# Patient Record
Sex: FEMALE | Race: OTHER | HISPANIC OR LATINO | URBAN - METROPOLITAN AREA
[De-identification: names, ages, dates, MRNs, and addresses within clinical notes are randomized per-mention and may not be internally consistent; named-entity substitution may affect disease eponyms.]

---

## 2022-04-02 ENCOUNTER — EMERGENCY (EMERGENCY)
Facility: HOSPITAL | Age: 32
LOS: 1 days | Discharge: HOME | End: 2022-04-02
Attending: EMERGENCY MEDICINE
Payer: MEDICAID

## 2022-04-02 VITALS
HEIGHT: 66 IN | RESPIRATION RATE: 18 BRPM | SYSTOLIC BLOOD PRESSURE: 117 MMHG | WEIGHT: 139.99 LBS | TEMPERATURE: 98 F | DIASTOLIC BLOOD PRESSURE: 78 MMHG | OXYGEN SATURATION: 100 % | HEART RATE: 93 BPM

## 2022-04-02 LAB
ALBUMIN SERPL ELPH-MCNC: 3.4 G/DL — LOW (ref 3.5–5)
ALP SERPL-CCNC: 78 U/L — SIGNIFICANT CHANGE UP (ref 40–120)
ALT FLD-CCNC: 28 U/L DA — SIGNIFICANT CHANGE UP (ref 10–60)
ANION GAP SERPL CALC-SCNC: 6 MMOL/L — SIGNIFICANT CHANGE UP (ref 5–17)
AST SERPL-CCNC: 27 U/L — SIGNIFICANT CHANGE UP (ref 10–40)
BASOPHILS # BLD AUTO: 0.02 K/UL — SIGNIFICANT CHANGE UP (ref 0–0.2)
BASOPHILS NFR BLD AUTO: 0.4 % — SIGNIFICANT CHANGE UP (ref 0–2)
BILIRUB SERPL-MCNC: 0.4 MG/DL — SIGNIFICANT CHANGE UP (ref 0.2–1.2)
BUN SERPL-MCNC: 9 MG/DL — SIGNIFICANT CHANGE UP (ref 7–18)
CALCIUM SERPL-MCNC: 8.7 MG/DL — SIGNIFICANT CHANGE UP (ref 8.4–10.5)
CHLORIDE SERPL-SCNC: 108 MMOL/L — SIGNIFICANT CHANGE UP (ref 96–108)
CO2 SERPL-SCNC: 25 MMOL/L — SIGNIFICANT CHANGE UP (ref 22–31)
CREAT SERPL-MCNC: 0.7 MG/DL — SIGNIFICANT CHANGE UP (ref 0.5–1.3)
EGFR: 118 ML/MIN/1.73M2 — SIGNIFICANT CHANGE UP
EOSINOPHIL # BLD AUTO: 0.02 K/UL — SIGNIFICANT CHANGE UP (ref 0–0.5)
EOSINOPHIL NFR BLD AUTO: 0.4 % — SIGNIFICANT CHANGE UP (ref 0–6)
GLUCOSE SERPL-MCNC: 112 MG/DL — HIGH (ref 70–99)
HCG SERPL-ACNC: <1 MIU/ML — SIGNIFICANT CHANGE UP
HCT VFR BLD CALC: 38.2 % — SIGNIFICANT CHANGE UP (ref 34.5–45)
HGB BLD-MCNC: 12.9 G/DL — SIGNIFICANT CHANGE UP (ref 11.5–15.5)
IMM GRANULOCYTES NFR BLD AUTO: 0.2 % — SIGNIFICANT CHANGE UP (ref 0–1.5)
LIDOCAIN IGE QN: 223 U/L — SIGNIFICANT CHANGE UP (ref 73–393)
LYMPHOCYTES # BLD AUTO: 2.08 K/UL — SIGNIFICANT CHANGE UP (ref 1–3.3)
LYMPHOCYTES # BLD AUTO: 38.5 % — SIGNIFICANT CHANGE UP (ref 13–44)
MCHC RBC-ENTMCNC: 26.9 PG — LOW (ref 27–34)
MCHC RBC-ENTMCNC: 33.8 GM/DL — SIGNIFICANT CHANGE UP (ref 32–36)
MCV RBC AUTO: 79.6 FL — LOW (ref 80–100)
MONOCYTES # BLD AUTO: 0.34 K/UL — SIGNIFICANT CHANGE UP (ref 0–0.9)
MONOCYTES NFR BLD AUTO: 6.3 % — SIGNIFICANT CHANGE UP (ref 2–14)
NEUTROPHILS # BLD AUTO: 2.93 K/UL — SIGNIFICANT CHANGE UP (ref 1.8–7.4)
NEUTROPHILS NFR BLD AUTO: 54.2 % — SIGNIFICANT CHANGE UP (ref 43–77)
NRBC # BLD: 0 /100 WBCS — SIGNIFICANT CHANGE UP (ref 0–0)
PLATELET # BLD AUTO: 238 K/UL — SIGNIFICANT CHANGE UP (ref 150–400)
POTASSIUM SERPL-MCNC: 3.7 MMOL/L — SIGNIFICANT CHANGE UP (ref 3.5–5.3)
POTASSIUM SERPL-SCNC: 3.7 MMOL/L — SIGNIFICANT CHANGE UP (ref 3.5–5.3)
PROT SERPL-MCNC: 7.5 G/DL — SIGNIFICANT CHANGE UP (ref 6–8.3)
RBC # BLD: 4.8 M/UL — SIGNIFICANT CHANGE UP (ref 3.8–5.2)
RBC # FLD: 14.4 % — SIGNIFICANT CHANGE UP (ref 10.3–14.5)
SODIUM SERPL-SCNC: 139 MMOL/L — SIGNIFICANT CHANGE UP (ref 135–145)
TROPONIN I, HIGH SENSITIVITY RESULT: <3 NG/L — SIGNIFICANT CHANGE UP
WBC # BLD: 5.4 K/UL — SIGNIFICANT CHANGE UP (ref 3.8–10.5)
WBC # FLD AUTO: 5.4 K/UL — SIGNIFICANT CHANGE UP (ref 3.8–10.5)

## 2022-04-02 PROCEDURE — 99285 EMERGENCY DEPT VISIT HI MDM: CPT | Mod: 25

## 2022-04-02 PROCEDURE — 85025 COMPLETE CBC W/AUTO DIFF WBC: CPT

## 2022-04-02 PROCEDURE — 83690 ASSAY OF LIPASE: CPT

## 2022-04-02 PROCEDURE — 71046 X-RAY EXAM CHEST 2 VIEWS: CPT | Mod: 26

## 2022-04-02 PROCEDURE — 36415 COLL VENOUS BLD VENIPUNCTURE: CPT

## 2022-04-02 PROCEDURE — 84484 ASSAY OF TROPONIN QUANT: CPT

## 2022-04-02 PROCEDURE — 84702 CHORIONIC GONADOTROPIN TEST: CPT

## 2022-04-02 PROCEDURE — 99284 EMERGENCY DEPT VISIT MOD MDM: CPT

## 2022-04-02 PROCEDURE — 71046 X-RAY EXAM CHEST 2 VIEWS: CPT

## 2022-04-02 PROCEDURE — 93005 ELECTROCARDIOGRAM TRACING: CPT

## 2022-04-02 PROCEDURE — 80053 COMPREHEN METABOLIC PANEL: CPT

## 2022-04-02 PROCEDURE — 96374 THER/PROPH/DIAG INJ IV PUSH: CPT

## 2022-04-02 RX ORDER — FAMOTIDINE 10 MG/ML
20 INJECTION INTRAVENOUS ONCE
Refills: 0 | Status: COMPLETED | OUTPATIENT
Start: 2022-04-02 | End: 2022-04-02

## 2022-04-02 RX ORDER — LIDOCAINE 4 G/100G
10 CREAM TOPICAL ONCE
Refills: 0 | Status: COMPLETED | OUTPATIENT
Start: 2022-04-02 | End: 2022-04-02

## 2022-04-02 RX ADMIN — Medication 30 MILLILITER(S): at 18:03

## 2022-04-02 RX ADMIN — LIDOCAINE 10 MILLILITER(S): 4 CREAM TOPICAL at 18:03

## 2022-04-02 RX ADMIN — FAMOTIDINE 20 MILLIGRAM(S): 10 INJECTION INTRAVENOUS at 18:03

## 2022-04-02 NOTE — ED PROVIDER NOTE - PATIENT PORTAL LINK FT
You can access the FollowMyHealth Patient Portal offered by [facility] by registering at the following website: [email or web address]. By joining [employer]’s FollowMyHealth portal, you will also be able to view your health information using other applications (apps) compatible with our system.

## 2022-04-02 NOTE — ED ADULT TRIAGE NOTE - CHIEF COMPLAINT QUOTE
c/o episode of epigastric pain this morning , felt heart beating fast and feels like gonna pass out . no chest pain

## 2022-04-02 NOTE — ED PROVIDER NOTE - NSFOLLOWUPCLINICS_GEN_ALL_ED_FT
[facility]  Internal Medicine  [address] [place] [address]  Phone: [phone]  Fax: [phone]

## 2022-04-02 NOTE — ED PROVIDER NOTE - NSFOLLOWUPINSTRUCTIONS_ED_ALL_ED_FT
Take Tylenol, Maalox, Pepcid over the counter as needed for pains.  Follow up with your doctor or in the Clinic as discussed within 1 week.  Return to the ER for any concerns.

## 2022-04-02 NOTE — ED ADULT NURSE NOTE - BRAND OF COVID-19 VACCINATION
Pfizer dose 1 and 2 pt. was sent from the shelter with c/o intoxication with unknown substance. pt. is responsive to verbal stimuli.      PE appears unremarkable of trauma, denies complaint, no focal neuro deficits appreciated at present

## 2022-04-02 NOTE — ED PROVIDER NOTE - OBJECTIVE STATEMENT
31yo F w hx of gastritis and palpitations on Metoprolol, in the ED for epigastric discomfort/bloating that radiates in the mid chest and L side of the neck. Assoc w palpitations and dizziness earlier today. Similar stx prior in the past. Minimally symptomatic upon exam. Denies cough/SOB/leg selling or pain, recent travel or sick contacts. No hx of ACS in the family. Not on OCP

## 2022-04-02 NOTE — ED ADULT NURSE NOTE - OBJECTIVE STATEMENT
[id number].  Patient present with c/o epigastric pain [id number].  Patient present with c/o epigastric pain on off associated with neck pain, palpitation. denies pain at present.

## 2022-04-02 NOTE — ED PROVIDER NOTE - CLINICAL SUMMARY MEDICAL DECISION MAKING FREE TEXT BOX
31yo w epig/chest and neck pain, a/w palpitations and dizziness. Low risk for cardiogenic syncope ACS (low HEART score), PE (PERC-0) or dissection. Likely gastritis, +/- anxiety. Will  get cardiac/abdom w/u, treat w GI cocktail, likely DC

## 2022-04-02 NOTE — ED PROVIDER NOTE - PHYSICAL EXAMINATION
Well appearing, in NAD  Moist mucosae  Pink conjunctivae  Lungs clear  Cardiac w RRR, no JVD  Abdomen soft/NT  No CVAT  No pedal edema, no calf TTP  Neck supple  AAOx3, no gross neuro deficits

## 2022-04-02 NOTE — ED ADULT NURSE NOTE - NSIMPLEMENTINTERV_GEN_ALL_ED
Implemented All Universal Safety Interventions:  Antoine to call system. Call bell, personal items and telephone within reach. Instruct patient to call for assistance. Room bathroom lighting operational. Non-slip footwear when patient is off stretcher. Physically safe environment: no spills, clutter or unnecessary equipment. Stretcher in lowest position, wheels locked, appropriate side rails in place.

## 2022-04-02 NOTE — ED ADULT TRIAGE NOTE - SOURCE OF INFORMATION
Pt. Was given 2.5 mg of methadone at 1630 and was not able to keep it down.  Danitza was advised.   Patient

## 2022-05-09 ENCOUNTER — EMERGENCY (EMERGENCY)
Facility: HOSPITAL | Age: 32
LOS: 1 days | Discharge: HOME | End: 2022-05-09
Attending: EMERGENCY MEDICINE
Payer: MEDICAID

## 2022-05-09 VITALS
HEART RATE: 76 BPM | DIASTOLIC BLOOD PRESSURE: 72 MMHG | HEIGHT: 66 IN | WEIGHT: 138.01 LBS | SYSTOLIC BLOOD PRESSURE: 128 MMHG | OXYGEN SATURATION: 100 % | TEMPERATURE: 98 F | RESPIRATION RATE: 16 BRPM

## 2022-05-09 VITALS
HEART RATE: 70 BPM | OXYGEN SATURATION: 100 % | SYSTOLIC BLOOD PRESSURE: 104 MMHG | RESPIRATION RATE: 18 BRPM | DIASTOLIC BLOOD PRESSURE: 67 MMHG

## 2022-05-09 LAB
ALBUMIN SERPL ELPH-MCNC: 3.9 G/DL — SIGNIFICANT CHANGE UP (ref 3.5–5)
ALP SERPL-CCNC: 81 U/L — SIGNIFICANT CHANGE UP (ref 40–120)
ALT FLD-CCNC: 29 U/L DA — SIGNIFICANT CHANGE UP (ref 10–60)
ANION GAP SERPL CALC-SCNC: 2 MMOL/L — LOW (ref 5–17)
AST SERPL-CCNC: 24 U/L — SIGNIFICANT CHANGE UP (ref 10–40)
BASOPHILS # BLD AUTO: 0.02 K/UL — SIGNIFICANT CHANGE UP (ref 0–0.2)
BASOPHILS NFR BLD AUTO: 0.4 % — SIGNIFICANT CHANGE UP (ref 0–2)
BILIRUB SERPL-MCNC: 0.3 MG/DL — SIGNIFICANT CHANGE UP (ref 0.2–1.2)
BUN SERPL-MCNC: 10 MG/DL — SIGNIFICANT CHANGE UP (ref 7–18)
CALCIUM SERPL-MCNC: 9.3 MG/DL — SIGNIFICANT CHANGE UP (ref 8.4–10.5)
CHLORIDE SERPL-SCNC: 107 MMOL/L — SIGNIFICANT CHANGE UP (ref 96–108)
CO2 SERPL-SCNC: 28 MMOL/L — SIGNIFICANT CHANGE UP (ref 22–31)
CREAT SERPL-MCNC: 0.68 MG/DL — SIGNIFICANT CHANGE UP (ref 0.5–1.3)
EGFR: 119 ML/MIN/1.73M2 — SIGNIFICANT CHANGE UP
EOSINOPHIL # BLD AUTO: 0.05 K/UL — SIGNIFICANT CHANGE UP (ref 0–0.5)
EOSINOPHIL NFR BLD AUTO: 1 % — SIGNIFICANT CHANGE UP (ref 0–6)
GLUCOSE SERPL-MCNC: 92 MG/DL — SIGNIFICANT CHANGE UP (ref 70–99)
HCG UR QL: NEGATIVE — SIGNIFICANT CHANGE UP
HCT VFR BLD CALC: 41.2 % — SIGNIFICANT CHANGE UP (ref 34.5–45)
HGB BLD-MCNC: 13.9 G/DL — SIGNIFICANT CHANGE UP (ref 11.5–15.5)
IMM GRANULOCYTES NFR BLD AUTO: 0 % — SIGNIFICANT CHANGE UP (ref 0–1.5)
LYMPHOCYTES # BLD AUTO: 3.1 K/UL — SIGNIFICANT CHANGE UP (ref 1–3.3)
LYMPHOCYTES # BLD AUTO: 64 % — HIGH (ref 13–44)
MAGNESIUM SERPL-MCNC: 2 MG/DL — SIGNIFICANT CHANGE UP (ref 1.6–2.6)
MCHC RBC-ENTMCNC: 26.8 PG — LOW (ref 27–34)
MCHC RBC-ENTMCNC: 33.7 GM/DL — SIGNIFICANT CHANGE UP (ref 32–36)
MCV RBC AUTO: 79.5 FL — LOW (ref 80–100)
MONOCYTES # BLD AUTO: 0.4 K/UL — SIGNIFICANT CHANGE UP (ref 0–0.9)
MONOCYTES NFR BLD AUTO: 8.3 % — SIGNIFICANT CHANGE UP (ref 2–14)
NEUTROPHILS # BLD AUTO: 1.27 K/UL — LOW (ref 1.8–7.4)
NEUTROPHILS NFR BLD AUTO: 26.3 % — LOW (ref 43–77)
NRBC # BLD: 0 /100 WBCS — SIGNIFICANT CHANGE UP (ref 0–0)
PLATELET # BLD AUTO: 242 K/UL — SIGNIFICANT CHANGE UP (ref 150–400)
POTASSIUM SERPL-MCNC: 4.5 MMOL/L — SIGNIFICANT CHANGE UP (ref 3.5–5.3)
POTASSIUM SERPL-SCNC: 4.5 MMOL/L — SIGNIFICANT CHANGE UP (ref 3.5–5.3)
PROT SERPL-MCNC: 8.1 G/DL — SIGNIFICANT CHANGE UP (ref 6–8.3)
RBC # BLD: 5.18 M/UL — SIGNIFICANT CHANGE UP (ref 3.8–5.2)
RBC # FLD: 14.1 % — SIGNIFICANT CHANGE UP (ref 10.3–14.5)
SODIUM SERPL-SCNC: 137 MMOL/L — SIGNIFICANT CHANGE UP (ref 135–145)
TROPONIN I, HIGH SENSITIVITY RESULT: 3.8 NG/L — SIGNIFICANT CHANGE UP
WBC # BLD: 4.84 K/UL — SIGNIFICANT CHANGE UP (ref 3.8–10.5)
WBC # FLD AUTO: 4.84 K/UL — SIGNIFICANT CHANGE UP (ref 3.8–10.5)

## 2022-05-09 PROCEDURE — 83735 ASSAY OF MAGNESIUM: CPT

## 2022-05-09 PROCEDURE — 80053 COMPREHEN METABOLIC PANEL: CPT

## 2022-05-09 PROCEDURE — 85025 COMPLETE CBC W/AUTO DIFF WBC: CPT

## 2022-05-09 PROCEDURE — 84484 ASSAY OF TROPONIN QUANT: CPT

## 2022-05-09 PROCEDURE — 36415 COLL VENOUS BLD VENIPUNCTURE: CPT

## 2022-05-09 PROCEDURE — 81025 URINE PREGNANCY TEST: CPT

## 2022-05-09 PROCEDURE — 71045 X-RAY EXAM CHEST 1 VIEW: CPT

## 2022-05-09 PROCEDURE — 99285 EMERGENCY DEPT VISIT HI MDM: CPT | Mod: 25

## 2022-05-09 PROCEDURE — 99284 EMERGENCY DEPT VISIT MOD MDM: CPT

## 2022-05-09 PROCEDURE — 71045 X-RAY EXAM CHEST 1 VIEW: CPT | Mod: 26

## 2022-05-09 PROCEDURE — 93005 ELECTROCARDIOGRAM TRACING: CPT

## 2022-05-09 RX ORDER — METHOCARBAMOL 500 MG/1
1 TABLET, FILM COATED ORAL
Qty: 15 | Refills: 0
Start: 2022-05-09 | End: 2022-05-13

## 2022-05-09 RX ORDER — KETOROLAC TROMETHAMINE 30 MG/ML
15 SYRINGE (ML) INJECTION ONCE
Refills: 0 | Status: DISCONTINUED | OUTPATIENT
Start: 2022-05-09 | End: 2022-05-09

## 2022-05-09 RX ORDER — METHOCARBAMOL 500 MG/1
750 TABLET, FILM COATED ORAL ONCE
Refills: 0 | Status: COMPLETED | OUTPATIENT
Start: 2022-05-09 | End: 2022-05-09

## 2022-05-09 RX ADMIN — METHOCARBAMOL 750 MILLIGRAM(S): 500 TABLET, FILM COATED ORAL at 21:13

## 2022-05-09 NOTE — ED PROVIDER NOTE - NS ED ATTENDING STATEMENT MOD
This was a shared visit with the MARISOL. I reviewed and verified the documentation and independently performed the documented:

## 2022-05-09 NOTE — ED PROVIDER NOTE - NSFOLLOWUPINSTRUCTIONS_ED_ALL_ED_FT
Seguimiento con el médico de atención primaria en 2-3 días.  Si experimenta síntomas nuevos o que empeoran, o si está preocupado, siempre puede regresar a la emergencia para sidney reevaluación.  Si le dieron alguna receta charan la visita de radha ramirez según lo prescrito. Si tiene alguna marc puede preguntar al farmacéutico.    * * *    Follow up with the primary care doctor in 2-3 days.  If you experience any new or worsening symptoms or if you are concerned you can always come back to the emergency for a re-evaluation.  If there were any prescriptions given to you during the visit today take them as prescribed. If you have any questions you can ask the pharmacist.

## 2022-05-09 NOTE — ED PROVIDER NOTE - PATIENT PORTAL LINK FT
You can access the FollowMyHealth Patient Portal offered by [facility] by registering at the following website: [email or web address]. By joining [employer]’s FollowMyHealth portal, you will also be able to view your health information using other applications (apps) compatible with our system.

## 2022-05-09 NOTE — ED PROVIDER NOTE - PHYSICAL EXAMINATION
Neck supple, full range of motion. No spinal/paraspinal tenderness to palpation.   Bilateral shoulder full range of motion. No clavicular or AC joint tenderness.  Upper extremities strength 5/5.  No facial/neck/back discoloration, erythema or induration or tenderness to palpation.

## 2022-05-09 NOTE — ED ADULT NURSE NOTE - NS_ED_NURSE_TEACHING_TOPIC_ED_A_ED
Patient is requesting refill for irbesartan (AVAPRO) 300 MG tablet  Last OV 6-, patient has no upcoming appointment scheduled    Medication refilled per protocol, medication sent to preferred pharmacy.   Medications

## 2022-05-09 NOTE — ED PROVIDER NOTE - PROGRESS NOTE DETAILS
Feeling much better after Robaxin. No chest pain. CXR NAD. Labs unremarkable. PERC negative. HEART score 0. Low suspicion for ACS/PE/dissection/cord compression/pneumonia. Symptoms most likely MSK etiology. WIll dc with PMD follow up in 2-3 days. Patient has had similar episodes of pains in the past without cause. Pt is well appearing walking with steady gait, stable for discharge and follow up without fail with medical doctor. I had a detailed discussion with the patient and/or guardian regarding the historical points, exam findings, and any diagnostic results supporting the discharge diagnosis. Pt educated on care and need for follow up. Strict return instructions and red flag signs and symptoms discussed with patient. Questions answered. Pt shows understanding of discharge information and agrees to follow.

## 2022-05-09 NOTE — ED PROVIDER NOTE - CLINICAL SUMMARY MEDICAL DECISION MAKING FREE TEXT BOX
32 year-old female, presents with intermittent L sided pain since yesterday. Well-appearing, neurovascularly intact. PLan: ECG, CXR, labs, urine, meds, re-assess.

## 2022-05-09 NOTE — ED PROVIDER NOTE - OBJECTIVE STATEMENT
ID# [id number] (Lewis): 32 year-old female, history of MVP, tachycardia on Metoprolol, presents with multiple medical complaints. since yesterday. Intermittent sharp pain to L upper back, L side of neck/face, L arm pain and L side of the chest. Pain started suddenly, last for ~ 3 minutes and resolve on its own. No specific triggers or alleviating factors.  ID# [id number] (Lewis): 32 year-old female, history of MVP, tachycardia on Metoprolol, presents with multiple medical complaints. since yesterday. Intermittent sharp pain to L upper back, L side of neck/face, L arm pain and L side of the chest. Pain started suddenly, last for ~ 3 minutes and resolve on its own. No specific triggers or alleviating factors. Denies any recent illness, any recent long travel, any birth control use. Denies any fever, chills, neck pain, numbness, tingling, focal weakness, SOB, dizziness, palpitations, leg swelling/pain or any other complaints.

## 2022-05-10 PROBLEM — K29.70 GASTRITIS, UNSPECIFIED, WITHOUT BLEEDING: Chronic | Status: ACTIVE | Noted: 2022-04-02

## 2025-06-06 ENCOUNTER — APPOINTMENT (OUTPATIENT)
Dept: ANTEPARTUM | Facility: CLINIC | Age: 35
End: 2025-06-06

## 2025-06-08 ENCOUNTER — INPATIENT (INPATIENT)
Facility: HOSPITAL | Age: 35
LOS: 2 days | Discharge: HOME | DRG: 833 | End: 2025-06-11
Attending: INTERNAL MEDICINE | Admitting: INTERNAL MEDICINE
Payer: MEDICAID

## 2025-06-08 VITALS
OXYGEN SATURATION: 99 % | WEIGHT: 147.27 LBS | HEART RATE: 76 BPM | SYSTOLIC BLOOD PRESSURE: 150 MMHG | DIASTOLIC BLOOD PRESSURE: 89 MMHG | HEIGHT: 66 IN | RESPIRATION RATE: 18 BRPM | TEMPERATURE: 98 F

## 2025-06-08 LAB
ALBUMIN SERPL ELPH-MCNC: 2.5 G/DL — LOW (ref 3.5–5)
ALP SERPL-CCNC: 63 U/L — SIGNIFICANT CHANGE UP (ref 40–120)
ALT FLD-CCNC: 25 U/L DA — SIGNIFICANT CHANGE UP (ref 10–60)
ANION GAP SERPL CALC-SCNC: 6 MMOL/L — SIGNIFICANT CHANGE UP (ref 5–17)
AST SERPL-CCNC: 23 U/L — SIGNIFICANT CHANGE UP (ref 10–40)
BASOPHILS # BLD AUTO: 0.02 K/UL — SIGNIFICANT CHANGE UP (ref 0–0.2)
BASOPHILS NFR BLD AUTO: 0.3 % — SIGNIFICANT CHANGE UP (ref 0–2)
BILIRUB SERPL-MCNC: 0.2 MG/DL — SIGNIFICANT CHANGE UP (ref 0.2–1.2)
BUN SERPL-MCNC: 11 MG/DL — SIGNIFICANT CHANGE UP (ref 7–18)
CALCIUM SERPL-MCNC: 8.8 MG/DL — SIGNIFICANT CHANGE UP (ref 8.4–10.5)
CHLORIDE SERPL-SCNC: 107 MMOL/L — SIGNIFICANT CHANGE UP (ref 96–108)
CO2 SERPL-SCNC: 23 MMOL/L — SIGNIFICANT CHANGE UP (ref 22–31)
CREAT SERPL-MCNC: 0.51 MG/DL — SIGNIFICANT CHANGE UP (ref 0.5–1.3)
EOSINOPHIL # BLD AUTO: 0.02 K/UL — SIGNIFICANT CHANGE UP (ref 0–0.5)
EOSINOPHIL NFR BLD AUTO: 0.3 % — SIGNIFICANT CHANGE UP (ref 0–6)
GLUCOSE SERPL-MCNC: 73 MG/DL — SIGNIFICANT CHANGE UP (ref 70–99)
HCT VFR BLD CALC: 31.6 % — LOW (ref 34.5–45)
HGB BLD-MCNC: 11.3 G/DL — LOW (ref 11.5–15.5)
IMM GRANULOCYTES NFR BLD AUTO: 0.3 % — SIGNIFICANT CHANGE UP (ref 0–0.9)
LIDOCAIN IGE QN: 69 U/L — SIGNIFICANT CHANGE UP (ref 13–75)
LYMPHOCYTES # BLD AUTO: 2.12 K/UL — SIGNIFICANT CHANGE UP (ref 1–3.3)
LYMPHOCYTES # BLD AUTO: 27.5 % — SIGNIFICANT CHANGE UP (ref 13–44)
MAGNESIUM SERPL-MCNC: 1.8 MG/DL — SIGNIFICANT CHANGE UP (ref 1.6–2.6)
MCHC RBC-ENTMCNC: 28 PG — SIGNIFICANT CHANGE UP (ref 27–34)
MCHC RBC-ENTMCNC: 35.8 G/DL — SIGNIFICANT CHANGE UP (ref 32–36)
MCV RBC AUTO: 78.2 FL — LOW (ref 80–100)
MONOCYTES # BLD AUTO: 0.97 K/UL — HIGH (ref 0–0.9)
MONOCYTES NFR BLD AUTO: 12.6 % — SIGNIFICANT CHANGE UP (ref 2–14)
NEUTROPHILS # BLD AUTO: 4.56 K/UL — SIGNIFICANT CHANGE UP (ref 1.8–7.4)
NEUTROPHILS NFR BLD AUTO: 59 % — SIGNIFICANT CHANGE UP (ref 43–77)
NRBC BLD AUTO-RTO: 0 /100 WBCS — SIGNIFICANT CHANGE UP (ref 0–0)
PLATELET # BLD AUTO: 203 K/UL — SIGNIFICANT CHANGE UP (ref 150–400)
POTASSIUM SERPL-MCNC: 4.3 MMOL/L — SIGNIFICANT CHANGE UP (ref 3.5–5.3)
POTASSIUM SERPL-SCNC: 4.3 MMOL/L — SIGNIFICANT CHANGE UP (ref 3.5–5.3)
PROT SERPL-MCNC: 6.7 G/DL — SIGNIFICANT CHANGE UP (ref 6–8.3)
RBC # BLD: 4.04 M/UL — SIGNIFICANT CHANGE UP (ref 3.8–5.2)
RBC # FLD: 15 % — HIGH (ref 10.3–14.5)
SODIUM SERPL-SCNC: 136 MMOL/L — SIGNIFICANT CHANGE UP (ref 135–145)
TROPONIN I, HIGH SENSITIVITY RESULT: 8.2 NG/L — SIGNIFICANT CHANGE UP
WBC # BLD: 7.71 K/UL — SIGNIFICANT CHANGE UP (ref 3.8–10.5)
WBC # FLD AUTO: 7.71 K/UL — SIGNIFICANT CHANGE UP (ref 3.8–10.5)

## 2025-06-08 PROCEDURE — 99285 EMERGENCY DEPT VISIT HI MDM: CPT

## 2025-06-08 PROCEDURE — 76817 TRANSVAGINAL US OBSTETRIC: CPT | Mod: 26

## 2025-06-08 PROCEDURE — 76815 OB US LIMITED FETUS(S): CPT | Mod: 26

## 2025-06-08 RX ORDER — ACETAMINOPHEN 500 MG/5ML
1000 LIQUID (ML) ORAL ONCE
Refills: 0 | Status: COMPLETED | OUTPATIENT
Start: 2025-06-08 | End: 2025-06-08

## 2025-06-08 RX ADMIN — Medication 400 MILLIGRAM(S): at 22:50

## 2025-06-08 RX ADMIN — Medication 1000 MILLILITER(S): at 22:50

## 2025-06-08 NOTE — ED ADULT TRIAGE NOTE - CHIEF COMPLAINT QUOTE
pt stated she is 12 weeks pregnant with high blood pressure and stomach pain denies vaginal bleeding or leaking.

## 2025-06-08 NOTE — ED PROVIDER NOTE - PROGRESS NOTE DETAILS
Ultrasound results are discussed with the patient with  ID [id number].  All questions answered.  Patient is okay with being admitted.  Case was discussed with Dr. Nunez -agrees with admission. Concern for hyperthyroidism

## 2025-06-08 NOTE — ED PROVIDER NOTE - INTERNATIONAL TRAVEL
Central Prior Authorization Team   Phone: [phone]      PA Initiation    Medication: hydrocodone-Initiated  Insurance Company: [employer] | [employer] - Phone [phone] Fax [phone]  Pharmacy Filling the Rx: [id number] - [place], [place] - [address] AT [facility] [address]  Filling Pharmacy Phone: [phone]  Filling Pharmacy Fax:    Start Date: 6/22/2020         No

## 2025-06-08 NOTE — ED PROVIDER NOTE - CLINICAL SUMMARY MEDICAL DECISION MAKING FREE TEXT BOX
35-year-old female Bulgarian-speaking  ID [id number] and [id number] coming in with intermittent palpitations, chest pain, and concern for elevated blood pressure systolic high 150s, headache, abdominal pain.  No nausea, vomiting, diarrhea, fevers, chills.  Patient reports she is 12 weeks pregnant and before pregnancy she was taking metoprolol for tachycardia.  She was told to stop taking the medication since she has been pregnant.  She had preeclampsia during her first pregnancy so her OB started her on aspirin.  Endorses lower abdominal pain has been ongoing since the pregnancy started.  Her OB is aware -last ultrasound was a week ago and she was told she has hydrops fetalis.  No vaginal bleeding at this time.  Does not have palpitations at this time.  Does not take anything for hypertension.    Patient is nontoxic-appearing.  No distress.  Regular rate and rhythm.  Clear to auscultation bilaterally.  Abdomen soft with diffuse tenderness to palpation in lower abdomen.  No CVA tenderness to palpation.  Equal strength and sensation in all extremities.    Differential diagnoses include but not limited to electrolyte abnormalities, anemia, UTI, threatened , ?  Intermittent SVT given the history, thyroid pathology.    Patient has a cardiologist.  Patient reports she was seen at [facility] for the symptoms and was told she needs echocardiogram because her troponin was elevated.  However due to system issues she was going to do outpatient workup but unable to make appointments. States that both cardiologist and OB are at [facility] and they are having "system shutdown" which is delaying her appointments and is unable to discuss her current symptoms with them.

## 2025-06-09 DIAGNOSIS — O02.1 MISSED ABORTION: ICD-10-CM

## 2025-06-09 DIAGNOSIS — I47.10 SUPRAVENTRICULAR TACHYCARDIA, UNSPECIFIED: ICD-10-CM

## 2025-06-09 DIAGNOSIS — O14.90 UNSPECIFIED PRE-ECLAMPSIA, UNSPECIFIED TRIMESTER: ICD-10-CM

## 2025-06-09 DIAGNOSIS — K21.9 GASTRO-ESOPHAGEAL REFLUX DISEASE WITHOUT ESOPHAGITIS: ICD-10-CM

## 2025-06-09 DIAGNOSIS — F32.9 MAJOR DEPRESSIVE DISORDER, SINGLE EPISODE, UNSPECIFIED: ICD-10-CM

## 2025-06-09 DIAGNOSIS — O26.899 OTHER SPECIFIED PREGNANCY RELATED CONDITIONS, UNSPECIFIED TRIMESTER: ICD-10-CM

## 2025-06-09 DIAGNOSIS — Z29.9 ENCOUNTER FOR PROPHYLACTIC MEASURES, UNSPECIFIED: ICD-10-CM

## 2025-06-09 DIAGNOSIS — Z98.891 HISTORY OF UTERINE SCAR FROM PREVIOUS SURGERY: Chronic | ICD-10-CM

## 2025-06-09 DIAGNOSIS — E05.90 THYROTOXICOSIS, UNSPECIFIED WITHOUT THYROTOXIC CRISIS OR STORM: ICD-10-CM

## 2025-06-09 LAB
ALBUMIN SERPL ELPH-MCNC: 2.4 G/DL — LOW (ref 3.5–5)
ALP SERPL-CCNC: 56 U/L — SIGNIFICANT CHANGE UP (ref 40–120)
ALT FLD-CCNC: 23 U/L DA — SIGNIFICANT CHANGE UP (ref 10–60)
ANION GAP SERPL CALC-SCNC: 3 MMOL/L — LOW (ref 5–17)
APPEARANCE UR: CLEAR — SIGNIFICANT CHANGE UP
APTT BLD: 30.6 SEC — SIGNIFICANT CHANGE UP (ref 26.1–36.8)
AST SERPL-CCNC: 21 U/L — SIGNIFICANT CHANGE UP (ref 10–40)
BACTERIA # UR AUTO: ABNORMAL /HPF
BASE EXCESS BLDV CALC-SCNC: -0.7 MMOL/L — SIGNIFICANT CHANGE UP
BASOPHILS # BLD AUTO: 0.01 K/UL — SIGNIFICANT CHANGE UP (ref 0–0.2)
BASOPHILS NFR BLD AUTO: 0.1 % — SIGNIFICANT CHANGE UP (ref 0–2)
BILIRUB SERPL-MCNC: 0.2 MG/DL — SIGNIFICANT CHANGE UP (ref 0.2–1.2)
BILIRUB UR-MCNC: NEGATIVE — SIGNIFICANT CHANGE UP
BLD GP AB SCN SERPL QL: SIGNIFICANT CHANGE UP
BLOOD GAS COMMENTS, VENOUS: SIGNIFICANT CHANGE UP
BUN SERPL-MCNC: 8 MG/DL — SIGNIFICANT CHANGE UP (ref 7–18)
CA-I SERPL-SCNC: SIGNIFICANT CHANGE UP MMOL/L (ref 1.15–1.33)
CALCIUM SERPL-MCNC: 8.5 MG/DL — SIGNIFICANT CHANGE UP (ref 8.4–10.5)
CHLORIDE SERPL-SCNC: 106 MMOL/L — SIGNIFICANT CHANGE UP (ref 96–108)
CO2 SERPL-SCNC: 27 MMOL/L — SIGNIFICANT CHANGE UP (ref 22–31)
COLOR SPEC: YELLOW — SIGNIFICANT CHANGE UP
CREAT SERPL-MCNC: 0.45 MG/DL — LOW (ref 0.5–1.3)
DIFF PNL FLD: ABNORMAL
EGFR: 125 ML/MIN/1.73M2 — SIGNIFICANT CHANGE UP
EGFR: 125 ML/MIN/1.73M2 — SIGNIFICANT CHANGE UP
EGFR: 129 ML/MIN/1.73M2 — SIGNIFICANT CHANGE UP
EGFR: 129 ML/MIN/1.73M2 — SIGNIFICANT CHANGE UP
EOSINOPHIL # BLD AUTO: 0.02 K/UL — SIGNIFICANT CHANGE UP (ref 0–0.5)
EOSINOPHIL NFR BLD AUTO: 0.3 % — SIGNIFICANT CHANGE UP (ref 0–6)
EPI CELLS # UR: PRESENT
GAS PNL BLDV: 130 MMOL/L — LOW (ref 136–145)
GAS PNL BLDV: SIGNIFICANT CHANGE UP
GAS PNL BLDV: SIGNIFICANT CHANGE UP
GLUCOSE BLDV-MCNC: 75 MG/DL — SIGNIFICANT CHANGE UP (ref 70–99)
GLUCOSE SERPL-MCNC: 78 MG/DL — SIGNIFICANT CHANGE UP (ref 70–99)
GLUCOSE UR QL: NEGATIVE MG/DL — SIGNIFICANT CHANGE UP
HCG SERPL-ACNC: HIGH MIU/ML
HCO3 BLDV-SCNC: 24 MMOL/L — SIGNIFICANT CHANGE UP (ref 22–29)
HCT VFR BLD CALC: 29.6 % — LOW (ref 34.5–45)
HCT VFR BLD CALC: 33.3 % — LOW (ref 34.5–45)
HGB BLD-MCNC: 10.7 G/DL — LOW (ref 11.5–15.5)
HGB BLD-MCNC: 11.9 G/DL — SIGNIFICANT CHANGE UP (ref 11.5–15.5)
HOROWITZ INDEX BLDV+IHG-RTO: SIGNIFICANT CHANGE UP
IMM GRANULOCYTES NFR BLD AUTO: 0.1 % — SIGNIFICANT CHANGE UP (ref 0–0.9)
INR BLD: 0.9 RATIO — SIGNIFICANT CHANGE UP (ref 0.85–1.16)
KETONES UR QL: NEGATIVE MG/DL — SIGNIFICANT CHANGE UP
LACTATE BLDV-MCNC: 1.3 MMOL/L — SIGNIFICANT CHANGE UP (ref 0.5–2)
LEUKOCYTE ESTERASE UR-ACNC: NEGATIVE — SIGNIFICANT CHANGE UP
LYMPHOCYTES # BLD AUTO: 1.91 K/UL — SIGNIFICANT CHANGE UP (ref 1–3.3)
LYMPHOCYTES # BLD AUTO: 27.2 % — SIGNIFICANT CHANGE UP (ref 13–44)
MAGNESIUM SERPL-MCNC: 1.9 MG/DL — SIGNIFICANT CHANGE UP (ref 1.6–2.6)
MCHC RBC-ENTMCNC: 28.1 PG — SIGNIFICANT CHANGE UP (ref 27–34)
MCHC RBC-ENTMCNC: 28.2 PG — SIGNIFICANT CHANGE UP (ref 27–34)
MCHC RBC-ENTMCNC: 35.7 G/DL — SIGNIFICANT CHANGE UP (ref 32–36)
MCHC RBC-ENTMCNC: 36.1 G/DL — HIGH (ref 32–36)
MCV RBC AUTO: 78.1 FL — LOW (ref 80–100)
MCV RBC AUTO: 78.5 FL — LOW (ref 80–100)
MONOCYTES # BLD AUTO: 0.75 K/UL — SIGNIFICANT CHANGE UP (ref 0–0.9)
MONOCYTES NFR BLD AUTO: 10.7 % — SIGNIFICANT CHANGE UP (ref 2–14)
NEUTROPHILS # BLD AUTO: 4.32 K/UL — SIGNIFICANT CHANGE UP (ref 1.8–7.4)
NEUTROPHILS NFR BLD AUTO: 61.6 % — SIGNIFICANT CHANGE UP (ref 43–77)
NITRITE UR-MCNC: NEGATIVE — SIGNIFICANT CHANGE UP
NRBC BLD AUTO-RTO: 0 /100 WBCS — SIGNIFICANT CHANGE UP (ref 0–0)
NRBC BLD AUTO-RTO: 0 /100 WBCS — SIGNIFICANT CHANGE UP (ref 0–0)
NT-PROBNP SERPL-SCNC: 525 PG/ML — HIGH (ref 0–125)
PCO2 BLDV: 40 MMHG — SIGNIFICANT CHANGE UP (ref 39–42)
PH BLDV: 7.39 — SIGNIFICANT CHANGE UP (ref 7.32–7.43)
PH UR: 6.5 — SIGNIFICANT CHANGE UP (ref 5–8)
PHOSPHATE SERPL-MCNC: 3.3 MG/DL — SIGNIFICANT CHANGE UP (ref 2.5–4.5)
PLATELET # BLD AUTO: 189 K/UL — SIGNIFICANT CHANGE UP (ref 150–400)
PLATELET # BLD AUTO: 196 K/UL — SIGNIFICANT CHANGE UP (ref 150–400)
PO2 BLDV: 38 MMHG — SIGNIFICANT CHANGE UP
POTASSIUM BLDV-SCNC: 4.6 MMOL/L — SIGNIFICANT CHANGE UP (ref 3.5–5.1)
POTASSIUM SERPL-MCNC: 3.8 MMOL/L — SIGNIFICANT CHANGE UP (ref 3.5–5.3)
POTASSIUM SERPL-SCNC: 3.8 MMOL/L — SIGNIFICANT CHANGE UP (ref 3.5–5.3)
PROT SERPL-MCNC: 6.1 G/DL — SIGNIFICANT CHANGE UP (ref 6–8.3)
PROT UR-MCNC: NEGATIVE MG/DL — SIGNIFICANT CHANGE UP
PROTHROM AB SERPL-ACNC: 10.5 SEC — SIGNIFICANT CHANGE UP (ref 9.9–13.4)
RBC # BLD: 3.79 M/UL — LOW (ref 3.8–5.2)
RBC # BLD: 4.24 M/UL — SIGNIFICANT CHANGE UP (ref 3.8–5.2)
RBC # FLD: 14.7 % — HIGH (ref 10.3–14.5)
RBC # FLD: 14.8 % — HIGH (ref 10.3–14.5)
RBC CASTS # UR COMP ASSIST: 1 /HPF — SIGNIFICANT CHANGE UP (ref 0–4)
SAO2 % BLDV: 65.7 % — SIGNIFICANT CHANGE UP
SODIUM SERPL-SCNC: 136 MMOL/L — SIGNIFICANT CHANGE UP (ref 135–145)
SP GR SPEC: 1.01 — SIGNIFICANT CHANGE UP (ref 1–1.03)
T3FREE SERPL-MCNC: 5.74 PG/ML — HIGH (ref 2–4.4)
T4 AB SER-ACNC: 19.8 UG/DL — HIGH (ref 4.6–12)
THYROPEROXIDASE AB SERPL-ACNC: 20.7 IU/ML — SIGNIFICANT CHANGE UP
TSH SERPL-MCNC: <0.01 UU/ML — LOW (ref 0.34–4.82)
UROBILINOGEN FLD QL: 0.2 MG/DL — SIGNIFICANT CHANGE UP (ref 0.2–1)
WBC # BLD: 6.19 K/UL — SIGNIFICANT CHANGE UP (ref 3.8–10.5)
WBC # BLD: 7.02 K/UL — SIGNIFICANT CHANGE UP (ref 3.8–10.5)
WBC # FLD AUTO: 6.19 K/UL — SIGNIFICANT CHANGE UP (ref 3.8–10.5)
WBC # FLD AUTO: 7.02 K/UL — SIGNIFICANT CHANGE UP (ref 3.8–10.5)
WBC UR QL: 3 /HPF — SIGNIFICANT CHANGE UP (ref 0–5)

## 2025-06-09 PROCEDURE — 99222 1ST HOSP IP/OBS MODERATE 55: CPT | Mod: GC

## 2025-06-09 RX ORDER — ACETAMINOPHEN 500 MG/5ML
650 LIQUID (ML) ORAL EVERY 6 HOURS
Refills: 0 | Status: DISCONTINUED | OUTPATIENT
Start: 2025-06-09 | End: 2025-06-11

## 2025-06-09 RX ORDER — MAGNESIUM, ALUMINUM HYDROXIDE 200-200 MG
30 TABLET,CHEWABLE ORAL EVERY 4 HOURS
Refills: 0 | Status: DISCONTINUED | OUTPATIENT
Start: 2025-06-09 | End: 2025-06-11

## 2025-06-09 RX ORDER — SERTRALINE 100 MG/1
1 TABLET, FILM COATED ORAL
Refills: 0 | DISCHARGE

## 2025-06-09 RX ORDER — ONDANSETRON HCL/PF 4 MG/2 ML
4 VIAL (ML) INJECTION EVERY 8 HOURS
Refills: 0 | Status: DISCONTINUED | OUTPATIENT
Start: 2025-06-09 | End: 2025-06-11

## 2025-06-09 RX ORDER — SERTRALINE 100 MG/1
100 TABLET, FILM COATED ORAL DAILY
Refills: 0 | Status: DISCONTINUED | OUTPATIENT
Start: 2025-06-09 | End: 2025-06-11

## 2025-06-09 RX ORDER — MELATONIN 5 MG
3 TABLET ORAL AT BEDTIME
Refills: 0 | Status: DISCONTINUED | OUTPATIENT
Start: 2025-06-09 | End: 2025-06-11

## 2025-06-09 RX ORDER — DOXYLAMINE SUCCINATE 25 MG/1
0.5 TABLET ORAL
Refills: 0 | DISCHARGE

## 2025-06-09 RX ORDER — ASPIRIN 325 MG
162 TABLET ORAL DAILY
Refills: 0 | Status: DISCONTINUED | OUTPATIENT
Start: 2025-06-09 | End: 2025-06-09

## 2025-06-09 RX ORDER — ASPIRIN 325 MG
2 TABLET ORAL
Refills: 0 | DISCHARGE

## 2025-06-09 RX ADMIN — SERTRALINE 100 MILLIGRAM(S): 100 TABLET, FILM COATED ORAL at 11:14

## 2025-06-09 RX ADMIN — Medication 162 MILLIGRAM(S): at 11:15

## 2025-06-09 RX ADMIN — Medication 20 MILLIGRAM(S): at 11:14

## 2025-06-09 RX ADMIN — Medication 4 MILLIGRAM(S): at 11:13

## 2025-06-09 RX ADMIN — Medication 650 MILLIGRAM(S): at 11:59

## 2025-06-09 RX ADMIN — Medication 650 MILLIGRAM(S): at 11:14

## 2025-06-09 NOTE — ED ADULT NURSE NOTE - OBJECTIVE STATEMENT
Patient presents to ED reporting 12 weeks pregnant, high blood pressure and stomach pain Denies vaginal bleeding or leaking fluids.

## 2025-06-09 NOTE — H&P ADULT - NSICDXPASTMEDICALHX_GEN_ALL_CORE_FT
PAST MEDICAL HISTORY:  Gastritis     GERD (gastroesophageal reflux disease)     Major depression     Paroxysmal SVT (supraventricular tachycardia)     Preeclampsia

## 2025-06-09 NOTE — H&P ADULT - NSHPPHYSICALEXAM_GEN_ALL_CORE
T(C): 36.9 (06-08-25 @ 23:53), Max: 36.9 (06-08-25 @ 23:53)  HR: 75 (06-09-25 @ 00:17) (64 - 78)  BP: 126/63 (06-09-25 @ 00:17) (121/61 - 151/83)  RR: 18 (06-09-25 @ 00:17) (18 - 18)  SpO2: 100% (06-09-25 @ 00:17) (99% - 100%)    GENERAL: NAD, speaks in full sentences, no signs of respiratory distress  HEAD:  Atraumatic, Normocephalic  EYES: EOMI, PERRLA, conjunctiva and sclera clear  NECK: Supple, No JVD  CHEST/LUNG: Clear to auscultation bilaterally; No wheeze; No crackles; No accessory muscles used  HEART: Regular rate and rhythm; No murmurs;   ABDOMEN: Soft, Nontender, Nondistended; Bowel sounds present; No guarding  EXTREMITIES:  2+ Peripheral Pulses, No cyanosis or edema  PSYCH: AAOx3  NEUROLOGY: non-focal  SKIN: No rashes or lesions

## 2025-06-09 NOTE — H&P ADULT - PROBLEM SELECTOR PLAN 2
Transvaginal US: fetal anasarca; findings of known fetal demise  patient aware of fetal demise  requesting OBGYN consult prior to leaving hospital

## 2025-06-09 NOTE — PATIENT PROFILE ADULT - NSPROIMPLANTSMEDDEV_GEN_A_NUR
We will call you with results. Please use the compression stockings for the swelling in your legs.    None

## 2025-06-09 NOTE — H&P ADULT - PROBLEM SELECTOR PLAN 1
c/o palpitation, chest pain  TSH <0.01, T4 19.8, T3 5.74  denies prior h/o thyroid issues  -f/u total T3, TSI, TPO  -Endo, Dr Cobb consulted

## 2025-06-09 NOTE — CONSULT NOTE ADULT - SUBJECTIVE AND OBJECTIVE BOX
OBGYN PA CONSULT NOTE:    HPI: 34y/o F presented to ER with chest pain and palpitations for 2 days, admitted to medicine for further workup.   Pt is s/p spontaneous  at 12 weeks secondary to hydrops fetalis. Ultrasound showed evidence of fetal demise. LMP 2. Pt went to [facility] for PNC. Pt started feeling cramping at 10 weeks and was told she had a fetal demise. Pt began bleeding 8 days ago and states the bleeding has been getting less and less.     Pt seen at bedside, c/o vaginal bleeding.   Denies pelvic pain, abd pain, cp, sob, dizziness, n/v/d/c, fever; chills     pob/gynhx: , Csection in  for PEC  pmhx: Supraventricular Tachy, Gastritis  pshx: CS in   all: denies  meds: Sertraline, Famotidine, PNV, Aspirin  sochx: no etoh/drug/tobacco use    REVIEW OF SYSTEMS: see HPI	    PE:  Vital Signs Last 24 Hrs  T(C): 36.7 (2025 20:40), Max: 36.9 (2025 23:53)  T(F): 98.1 (2025 20:40), Max: 98.5 (2025 23:53)  HR: 64 (2025 20:40) (64 - 81)  BP: 130/76 (2025 20:40) (112/61 - 151/83)  RR: 18 (2025 20:40) (17 - 18)  SpO2: 98% (2025 20:40) (98% - 100%)    Parameters below as of 2025 20:40  Patient On (Oxygen Delivery Method): room air    Physical Exam:  General: A & O x 3, in NAD  abd: +bs; soft, nt, nd, no rebound or guarding; no cvat b/l  pelvis: no cmt; mild vaginal bleeding, brownish in color, small clot noted, no odor. Cervix open. Patient unable to tolerate speculum exam    LABS:                        11.9   6.19  )-----------( 196      ( 2025 20:50 )             33.3         136  |  106  |  8   ----------------------------<  78  3.8   |  27  |  0.45[L]    Ca    8.5      2025 05:00  Phos  3.3     -  Mg     1.9     -    TPro  6.1  /  Alb  2.4[L]  /  TBili  0.2  /  DBili  x   /  AST  21  /  ALT  23  /  AlkPhos  56  -09    PT/INR - ( 2025 20:50 )   PT: 10.5 sec;   INR: 0.90 ratio         PTT - ( 2025 20:50 )  PTT:30.6 sec  Urinalysis Basic - ( 2025 05:00 )    Color: x / Appearance: x / SG: x / pH: x  Gluc: 78 mg/dL / Ketone: x  / Bili: x / Urobili: x   Blood: x / Protein: x / Nitrite: x   Leuk Esterase: x / RBC: x / WBC x   Sq Epi: x / Non Sq Epi: x / Bacteria: x        RADIOLOGY & ADDITIONAL STUDIES:  sono  < from: US OB <=14 Weeks, Nyla Gestation (25 @ 23:12) >  ACC: [id number] EXAM:  US OB TRANSVAGINAL   ORDERED BY: ELEANOR BROOKS     ACC: [id number] EXAM:  US OB LES THAN 14WK EA ADD GES   ORDERED BY:   ELEANOR BROOKS     PROCEDURE DATE:  2025          INTERPRETATION:  CLINICAL INFORMATION: lower ab pain, 12 wks preg, she   was told she has hydrops fetalis as per OB.    LMP: 2025    Estimated Gestational Age by LMP: 16 weeks 2 days    COMPARISON: None available.    TECHNIQUE: Transabdominal and transvaginal OB ultrasound was performed.    FINDINGS:    Uterus: 13.3 x 12.1 x 13.4 cm.    Endometrium: Single intrauterine gestation.  Crown-lump length: 5.8 cm (12 weeks 2 days); fetal anasarca.  Fetal cardiac activity: Not detected  Yolk sac: Not visualized    Cervix: Closed. Trace fluid.  Rightovary: 3.4 x 2.4 x 2.3 cm. Small follicles. Flow present.  Left ovary: Not visualized.  Additional: Trace free fluid.    IMPRESSION:    Findings of known fetal demise.    --- End of Report ---            GEOVANNI BLOOM MD; Attending Radiologist  This document has been electronically signed. 2025 12:50AM    < end of copied text >

## 2025-06-09 NOTE — H&P ADULT - HISTORY OF PRESENT ILLNESS
35y/F, from home, s/p spontaneous  at 12WOG 2/2 hydrops fetalis, SVT, Preeclampsia in last pregnancy, Depression, GERD presented to ED with chest pain and palpitation for 2 days. States her BP reading at home was -150, has headache and nausea, Was seen by her OBGYN, was started on aspirin 2d ago, was told she has hydrops fetalis. States she has been having on and off abdominal pain and vaginal bleeding for 1 week. Endorses only palpitation at the time of exam.  Reports she has h/o SVT, was on Metoprolol but was stopped by her OBGYN when she found out she was pregnant. Denies fever and chills, SOB, abdominal pain, urinary symptoms, Denies any thyroid issues in the past.

## 2025-06-09 NOTE — H&P ADULT - ATTENDING COMMENTS
IMAGING  Transvaginal Ultrasound  IMPRESSION:  Findings of known fetal demise.    EKG  Normal Sinus Rhythm, 73 bpm, QTc 425ms, TN 160ms, on my read no ST segment elevation or depression, no TWI    HPI  35 year old female patient with pmhx 12 weeks pregnant, Cora Valve Prolapse, tachycardia on metoprolol, pre-eclampsia on aspirin, gastritis who presented to the ER due to palpitations, lower abdominal pain, and high blood pressure 150s systolic.  In the ER patient's TSH of <0.01 and T4 elevated at 19.8. Patient denies history of thyroid problems. She endorses having a fever about 4 weeks ago and having a runny/stuffy nose recently, but no pain or swelling on the anterior part of her neck. She states she used to be on Metoprolol for SVT but her Cardiologist stopped it when she became pregnant. Transvaginal ultrasound with fetal demise but patient states she was already told last week that she has hydrops fetalis. No vaginal bleeding currently, but she has some lower abdominal cramping and she had some vaginal bleeding 8 days ago. She was started on her aspirin 2 days ago for her pre-eclampsia.    Review Of Systems included: + palpitations, + chest pain, + epigastric pain, + lower abdominal cramping pain, + headache, + fever 4 weeks ago, + runny/stuffy nose,   No fever currently, no chills, no nausea, no vomiting, no diarrhea, no vaginal bleeding currently (had some vaginal bleeding 8 days ago), no neck pain, no neck swelling     #[id number] (Katina)  Physical Exam  General: Awake, Alert, Oriented, no tenderness to palpation of anterior of neck, no swelling of anterior of neck  Cardiac: Tachycardia, Regular Rhythm  Pulmonary: CTA b/l  Abdominal: Soft, ND, NT  Extremities: No edema b/l    A/P  # Symptomatic New Onset Hyperthyroidism  > TSH low at <0.01, T4 elevated at 19.8  - Cardiac Telemetry Monitoring  - Consult Endocrinology  - Check T3  - Check TSI    # Fetal Demise  - Consult ObGyn  - Tylenol prn for lower abdominal cramping pain    # Hx of 12 weeks pregnant, Cora Valve Prolapse, tachycardia on metoprolol, pre-eclampsia on aspirin, gastritis  - Continue home medications Aspirin, Sertraline, Famotidine    # DVT PPx  - SCDs  IMPROVE Score: 0 pts    # FEN  - Regular Diet  - Monitor and replete electrolytes as needed    Previous Admissions Included  2/15/2024: s/p Colonoscopy for Chronic constipation with hematochezia, intermittent abdominal pain, possible hemorrhoid / anal fissure. Discharge Diagnoses: Colonoscopy to cecum, and terminal ileum, normal. Repeat colonoscopy at age 45.  5/9/2022: ER Visit for chest pain  4/2/2022: ER Visit for GERD    Patient case and management was discussed with ER Attending  I did examine all labs (including CBC, CMP, Troponin, HCG, Lipase, Mg, TSH, pro-BNP, VBG, UA), imaging, prior notes

## 2025-06-09 NOTE — CONSULT NOTE ADULT - ASSESSMENT
34y/o F presented to ER with chest pain and palpitations for 2 days, admitted to medicine for further workup. Pt is s/p spontaneous  at 12 weeks secondary to hydrops fetalis. Ultrasound showed evidence of fetal demise.       - No acute OBGYN intervention at this time  - Recommend close OBGYN outpatient followup for D & E  - Discussed with Dr. Cabrera (OBGYN In house attending)  
35y/F, from home, s/p spontaneous  at 12WOG 2/2 hydrops fetalis, SVT, Preeclampsia in last pregnancy, Depression, GERD presented to ED with chest pain and palpitation for 2 days.   Endocrine consulted for eval of abn tfts. Pt denies prev hx of thyroid dz. No fam hx of thyroid dz. Admits to occ palp.

## 2025-06-09 NOTE — CONSULT NOTE ADULT - SUBJECTIVE AND OBJECTIVE BOX
Patient is a 35y old  Female who presents with a chief complaint of Palpitation, chest pain (2025 01:37)      HPI:  35y/F, from home, s/p spontaneous  at 12WOG 2/2 hydrops fetalis, SVT, Preeclampsia in last pregnancy, Depression, GERD presented to ED with chest pain and palpitation for 2 days. States her BP reading at home was -150, has headache and nausea, Was seen by her OBGYN, was started on aspirin 2d ago, was told she has hydrops fetalis. States she has been having on and off abdominal pain and vaginal bleeding for 1 week. Endorses only palpitation at the time of exam.  Reports she has h/o SVT, was on Metoprolol but was stopped by her OBGYN when she found out she was pregnant. Denies fever and chills, SOB, abdominal pain, urinary symptoms, Denies any thyroid issues in the past. (2025 01:37)      PAST MEDICAL & SURGICAL HISTORY:  Gastritis      Preeclampsia      Paroxysmal SVT (supraventricular tachycardia)      Major depression      GERD (gastroesophageal reflux disease)      H/O  section             MEDICATIONS  (STANDING):  aspirin enteric coated 162 milliGRAM(s) Oral daily  famotidine    Tablet 20 milliGRAM(s) Oral daily  sertraline 100 milliGRAM(s) Oral daily    MEDICATIONS  (PRN):  acetaminophen     Tablet .. 650 milliGRAM(s) Oral every 6 hours PRN Temp greater or equal to 38C (100.4F), Mild Pain (1 - 3)  aluminum hydroxide/magnesium hydroxide/simethicone Suspension 30 milliLiter(s) Oral every 4 hours PRN Dyspepsia  melatonin 3 milliGRAM(s) Oral at bedtime PRN Insomnia  ondansetron Injectable 4 milliGRAM(s) IV Push every 8 hours PRN Nausea and/or Vomiting      FAMILY HISTORY:      SOCIAL HISTORY:      REVIEW OF SYSTEMS:  CONSTITUTIONAL: No fever, weight loss, or fatigue  EYES: No eye pain, visual disturbances, or discharge  ENT:  No difficulty hearing, tinnitus, vertigo; No sinus or throat pain  NECK: No pain or stiffness  RESPIRATORY: No cough, wheezing, chills or hemoptysis; No Shortness of Breath  CARDIOVASCULAR: No chest pain, palpitations, passing out, dizziness, or leg swelling  GASTROINTESTINAL: No abdominal or epigastric pain. No nausea, vomiting, or hematemesis; No diarrhea or constipation. No melena or hematochezia.  GENITOURINARY: No dysuria, frequency, hematuria, or incontinence  NEUROLOGICAL: No headaches, memory loss, loss of strength, numbness, or tremors  SKIN: No itching, burning, rashes, or lesions   LYMPH Nodes: No enlarged glands  ENDOCRINE: No heat or cold intolerance; No hair loss  MUSCULOSKELETAL: No joint pain or swelling; No muscle, back, or extremity pain  PSYCHIATRIC: No depression, anxiety, mood swings, or difficulty sleeping  HEME/LYMPH: No easy bruising, or bleeding gums  ALLERGY AND IMMUNOLOGIC: No hives or eczema	        Vital Signs Last 24 Hrs  T(C): 36.8 (2025 09:28), Max: 36.9 (2025 23:53)  T(F): 98.2 (2025 09:28), Max: 98.5 (2025 23:53)  HR: 73 (2025 09:28) (64 - 81)  BP: 129/68 (2025 09:28) (112/61 - 151/83)  BP(mean): --  RR: 17 (2025 09:28) (17 - 18)  SpO2: 99% (2025 09:28) (98% - 100%)    Parameters below as of 2025 09:28  Patient On (Oxygen Delivery Method): room air          Constitutional:    NC/AT:    HEENT:    Neck:  No JVD, bruits or thyromegaly    Respiratory:  Clear without rales or rhonchi    Cardiovascular:  RR without murmur, rub or gallop.    Gastrointestinal: Soft without hepatosplenomegaly.    Extremities: without cyanosis, clubbing or edema.    Neurological:  Oriented   x      . No gross sensory or motor defects.        LABS:                        10.7   7.02  )-----------( 189      ( 2025 05:00 )             29.6     06-    136  |  106  |  8   ----------------------------<  78  3.8   |  27  |  0.45[L]    Ca    8.5      2025 05:00  Phos  3.3     06-  Mg     1.9     06-09    TPro  6.1  /  Alb  2.4[L]  /  TBili  0.2  /  DBili  x   /  AST  21  /  ALT  23  /  AlkPhos  56  -          Urinalysis Basic - ( 2025 05:00 )    Color: x / Appearance: x / SG: x / pH: x  Gluc: 78 mg/dL / Ketone: x  / Bili: x / Urobili: x   Blood: x / Protein: x / Nitrite: x   Leuk Esterase: x / RBC: x / WBC x   Sq Epi: x / Non Sq Epi: x / Bacteria: x      CAPILLARY BLOOD GLUCOSE          RADIOLOGY & ADDITIONAL STUDIES: Patient is a 35y old  Female who presents with a chief complaint of Palpitation, chest pain (2025 01:37)      HPI:  35y/F, from home, s/p spontaneous  at 12WOG 2/2 hydrops fetalis, SVT, Preeclampsia in last pregnancy, Depression, GERD presented to ED with chest pain and palpitation for 2 days. States her BP reading at home was -150, has headache and nausea, Was seen by her OBGYN, was started on aspirin 2d ago, was told she has hydrops fetalis. States she has been having on and off abdominal pain and vaginal bleeding for 1 week. Endorses only palpitation at the time of exam.  Reports she has h/o SVT, was on Metoprolol but was stopped by her OBGYN when she found out she was pregnant. Denies fever and chills, SOB, abdominal pain, urinary symptoms, Denies any thyroid issues in the past. (2025 01:37)  Endocrine consulted for eval of abn tfts. Pt denies prev hx of thyroid dz. No fam hx of thyroid dz. Admits to occ palp.    PAST MEDICAL & SURGICAL HISTORY:  Gastritis      Preeclampsia      Paroxysmal SVT (supraventricular tachycardia)      Major depression      GERD (gastroesophageal reflux disease)      H/O  section             MEDICATIONS  (STANDING):  aspirin enteric coated 162 milliGRAM(s) Oral daily  famotidine    Tablet 20 milliGRAM(s) Oral daily  sertraline 100 milliGRAM(s) Oral daily    MEDICATIONS  (PRN):  acetaminophen     Tablet .. 650 milliGRAM(s) Oral every 6 hours PRN Temp greater or equal to 38C (100.4F), Mild Pain (1 - 3)  aluminum hydroxide/magnesium hydroxide/simethicone Suspension 30 milliLiter(s) Oral every 4 hours PRN Dyspepsia  melatonin 3 milliGRAM(s) Oral at bedtime PRN Insomnia  ondansetron Injectable 4 milliGRAM(s) IV Push every 8 hours PRN Nausea and/or Vomiting      FAMILY HISTORY:      SOCIAL HISTORY:      REVIEW OF SYSTEMS:  CONSTITUTIONAL: No fever, weight loss, or fatigue  EYES: No eye pain, visual disturbances, or discharge  ENT:  No difficulty hearing, tinnitus, vertigo; No sinus or throat pain  NECK: No pain or stiffness  RESPIRATORY: No cough, wheezing, chills or hemoptysis; No Shortness of Breath  CARDIOVASCULAR: No chest pain, palpitations, passing out, dizziness, or leg swelling  GASTROINTESTINAL: No abdominal or epigastric pain. No nausea, vomiting, or hematemesis; No diarrhea or constipation. No melena or hematochezia.  GENITOURINARY: No dysuria, frequency, hematuria, or incontinence  NEUROLOGICAL: No headaches, memory loss, loss of strength, numbness, or tremors  SKIN: No itching, burning, rashes, or lesions   LYMPH Nodes: No enlarged glands  ENDOCRINE: No heat or cold intolerance; No hair loss  MUSCULOSKELETAL: No joint pain or swelling; No muscle, back, or extremity pain  PSYCHIATRIC: No depression, anxiety, mood swings, or difficulty sleeping  HEME/LYMPH: No easy bruising, or bleeding gums  ALLERGY AND IMMUNOLOGIC: No hives or eczema	        Vital Signs Last 24 Hrs  T(C): 36.8 (2025 09:28), Max: 36.9 (2025 23:53)  T(F): 98.2 (2025 09:28), Max: 98.5 (2025 23:53)  HR: 73 (2025 09:28) (64 - 81)  BP: 129/68 (2025 09:28) (112/61 - 151/83)  BP(mean): --  RR: 17 (2025 09:28) (17 - 18)  SpO2: 99% (2025 09:28) (98% - 100%)    Parameters below as of 2025 09:28  Patient On (Oxygen Delivery Method): room air          Constitutional:    HEENT: NAD    Neck:  No JVD, bruits or thyromegaly    Respiratory:  Clear without rales or rhonchi    Cardiovascular:  RR without murmur, rub or gallop.    Gastrointestinal: Soft without hepatosplenomegaly.    Extremities: without cyanosis, clubbing or edema.    Neurological:  Oriented   x 3     . No gross sensory or motor defects.        LABS:                        10.7   7.02  )-----------( 189      ( 2025 05:00 )             29.6     -    136  |  106  |  8   ----------------------------<  78  3.8   |  27  |  0.45[L]    Ca    8.5      2025 05:00  Phos  3.3     06-  Mg     1.9     06-09    TPro  6.1  /  Alb  2.4[L]  /  TBili  0.2  /  DBili  x   /  AST  21  /  ALT  23  /  AlkPhos  56  06-          Urinalysis Basic - ( 2025 05:00 )    Color: x / Appearance: x / SG: x / pH: x  Gluc: 78 mg/dL / Ketone: x  / Bili: x / Urobili: x   Blood: x / Protein: x / Nitrite: x   Leuk Esterase: x / RBC: x / WBC x   Sq Epi: x / Non Sq Epi: x / Bacteria: x      CAPILLARY BLOOD GLUCOSE          RADIOLOGY & ADDITIONAL STUDIES:

## 2025-06-09 NOTE — ED ADULT NURSE REASSESSMENT NOTE - NS ED NURSE REASSESS COMMENT FT1
Received pt alert and oriented x 4. On cardiac monitor. IV 20g to right AC intact and patent. Awaiting bed assignment.

## 2025-06-09 NOTE — H&P ADULT - ASSESSMENT
35y/F, from home, s/p spontaneous  at 12WOG 2/2 hydrops fetalis, SVT, Preeclampsia in last pregnancy, Depression, GERD presented to ED with chest pain and palpitation for 2 days. States her BP reading at home was -150, has headache and nausea, US showing evidence of fetal demise. TSH <0.01. Admitted for further evaluation.

## 2025-06-09 NOTE — H&P ADULT - PROBLEM SELECTOR PLAN 3
has h/o SVT, not on any meds  tachycardia with minimum exertion  EKG: sinus rhythm  -admit to tele for at least 24 hrs  -monitor for now

## 2025-06-09 NOTE — CONSULT NOTE ADULT - PROBLEM SELECTOR RECOMMENDATION 9
likely pregnancy induced- HCG > 588888  rec beta blockers  holf off on anti- thyroid meds for now  check tfts in 2-4 wks   check TSI level  d/w prim team
- No acute OBGYN intervention at this time  - Recommend close OBGYN outpatient followup for D & E  - Discussed with Dr. Cabrera (OBGYN In house attending)

## 2025-06-10 ENCOUNTER — TRANSCRIPTION ENCOUNTER (OUTPATIENT)
Age: 35
End: 2025-06-10

## 2025-06-10 LAB
ALBUMIN SERPL ELPH-MCNC: 2.3 G/DL — LOW (ref 3.5–5)
ALP SERPL-CCNC: 59 U/L — SIGNIFICANT CHANGE UP (ref 40–120)
ALT FLD-CCNC: 31 U/L DA — SIGNIFICANT CHANGE UP (ref 10–60)
ANION GAP SERPL CALC-SCNC: 5 MMOL/L — SIGNIFICANT CHANGE UP (ref 5–17)
AST SERPL-CCNC: 27 U/L — SIGNIFICANT CHANGE UP (ref 10–40)
BASOPHILS # BLD AUTO: 0.02 K/UL — SIGNIFICANT CHANGE UP (ref 0–0.2)
BASOPHILS NFR BLD AUTO: 0.3 % — SIGNIFICANT CHANGE UP (ref 0–2)
BILIRUB SERPL-MCNC: 0.2 MG/DL — SIGNIFICANT CHANGE UP (ref 0.2–1.2)
BUN SERPL-MCNC: 8 MG/DL — SIGNIFICANT CHANGE UP (ref 7–18)
CALCIUM SERPL-MCNC: 8.8 MG/DL — SIGNIFICANT CHANGE UP (ref 8.4–10.5)
CHLORIDE SERPL-SCNC: 106 MMOL/L — SIGNIFICANT CHANGE UP (ref 96–108)
CO2 SERPL-SCNC: 25 MMOL/L — SIGNIFICANT CHANGE UP (ref 22–31)
CREAT SERPL-MCNC: 0.46 MG/DL — LOW (ref 0.5–1.3)
CULTURE RESULTS: SIGNIFICANT CHANGE UP
EGFR: 128 ML/MIN/1.73M2 — SIGNIFICANT CHANGE UP
EGFR: 128 ML/MIN/1.73M2 — SIGNIFICANT CHANGE UP
EOSINOPHIL # BLD AUTO: 0.03 K/UL — SIGNIFICANT CHANGE UP (ref 0–0.5)
EOSINOPHIL NFR BLD AUTO: 0.5 % — SIGNIFICANT CHANGE UP (ref 0–6)
GLUCOSE SERPL-MCNC: 71 MG/DL — SIGNIFICANT CHANGE UP (ref 70–99)
HCT VFR BLD CALC: 31.1 % — LOW (ref 34.5–45)
HGB BLD-MCNC: 10.9 G/DL — LOW (ref 11.5–15.5)
IMM GRANULOCYTES NFR BLD AUTO: 0.3 % — SIGNIFICANT CHANGE UP (ref 0–0.9)
LYMPHOCYTES # BLD AUTO: 1.77 K/UL — SIGNIFICANT CHANGE UP (ref 1–3.3)
LYMPHOCYTES # BLD AUTO: 30.1 % — SIGNIFICANT CHANGE UP (ref 13–44)
MAGNESIUM SERPL-MCNC: 1.8 MG/DL — SIGNIFICANT CHANGE UP (ref 1.6–2.6)
MCHC RBC-ENTMCNC: 27.5 PG — SIGNIFICANT CHANGE UP (ref 27–34)
MCHC RBC-ENTMCNC: 35 G/DL — SIGNIFICANT CHANGE UP (ref 32–36)
MCV RBC AUTO: 78.3 FL — LOW (ref 80–100)
MONOCYTES # BLD AUTO: 0.67 K/UL — SIGNIFICANT CHANGE UP (ref 0–0.9)
MONOCYTES NFR BLD AUTO: 11.4 % — SIGNIFICANT CHANGE UP (ref 2–14)
NEUTROPHILS # BLD AUTO: 3.37 K/UL — SIGNIFICANT CHANGE UP (ref 1.8–7.4)
NEUTROPHILS NFR BLD AUTO: 57.4 % — SIGNIFICANT CHANGE UP (ref 43–77)
NRBC BLD AUTO-RTO: 0 /100 WBCS — SIGNIFICANT CHANGE UP (ref 0–0)
PHOSPHATE SERPL-MCNC: 3.6 MG/DL — SIGNIFICANT CHANGE UP (ref 2.5–4.5)
PLATELET # BLD AUTO: 191 K/UL — SIGNIFICANT CHANGE UP (ref 150–400)
POTASSIUM SERPL-MCNC: 4 MMOL/L — SIGNIFICANT CHANGE UP (ref 3.5–5.3)
POTASSIUM SERPL-SCNC: 4 MMOL/L — SIGNIFICANT CHANGE UP (ref 3.5–5.3)
PROT SERPL-MCNC: 6.1 G/DL — SIGNIFICANT CHANGE UP (ref 6–8.3)
RBC # BLD: 3.97 M/UL — SIGNIFICANT CHANGE UP (ref 3.8–5.2)
RBC # FLD: 14.7 % — HIGH (ref 10.3–14.5)
SODIUM SERPL-SCNC: 136 MMOL/L — SIGNIFICANT CHANGE UP (ref 135–145)
SPECIMEN SOURCE: SIGNIFICANT CHANGE UP
WBC # BLD: 5.88 K/UL — SIGNIFICANT CHANGE UP (ref 3.8–10.5)
WBC # FLD AUTO: 5.88 K/UL — SIGNIFICANT CHANGE UP (ref 3.8–10.5)

## 2025-06-10 PROCEDURE — 99232 SBSQ HOSP IP/OBS MODERATE 35: CPT | Mod: GC

## 2025-06-10 RX ORDER — ACETAMINOPHEN 500 MG/5ML
1000 LIQUID (ML) ORAL ONCE
Refills: 0 | Status: COMPLETED | OUTPATIENT
Start: 2025-06-10 | End: 2025-06-10

## 2025-06-10 RX ADMIN — Medication 30 MILLILITER(S): at 11:53

## 2025-06-10 RX ADMIN — SERTRALINE 100 MILLIGRAM(S): 100 TABLET, FILM COATED ORAL at 11:29

## 2025-06-10 RX ADMIN — Medication 400 MILLIGRAM(S): at 14:15

## 2025-06-10 RX ADMIN — Medication 20 MILLIGRAM(S): at 11:29

## 2025-06-10 RX ADMIN — Medication 1000 MILLIGRAM(S): at 14:45

## 2025-06-10 RX ADMIN — Medication 4 MILLIGRAM(S): at 14:15

## 2025-06-10 NOTE — PROGRESS NOTE ADULT - PROBLEM SELECTOR PLAN 4
has h/o preeclampsia in last pregnancy  on aspirin 162 at home  c/w home meds has h/o preeclampsia in last pregnancy  on aspirin 162 at home    -hold asa prior to D&E

## 2025-06-10 NOTE — PROGRESS NOTE ADULT - SUBJECTIVE AND OBJECTIVE BOX
Interval Events:      Allergies    No Known Allergies    Intolerances      Endocrine/Metabolic Medications:      Vital Signs Last 24 Hrs  T(C): 36.7 (10 Giuseppe 2025 08:25), Max: 36.9 (09 Jun 2025 23:41)  T(F): 98.1 (10 Giuseppe 2025 08:25), Max: 98.4 (09 Jun 2025 23:41)  HR: 66 (10 Giuseppe 2025 08:25) (63 - 75)  BP: 136/75 (10 Giuseppe 2025 08:25) (120/63 - 136/75)  BP(mean): --  RR: 17 (10 Giuseppe 2025 08:25) (17 - 18)  SpO2: 99% (10 Giuseppe 2025 08:25) (98% - 100%)    Parameters below as of 10 Giuseppe 2025 08:25  Patient On (Oxygen Delivery Method): room air          PHYSICAL EXAM  All physical exam findings normal, except those marked:  General:	Alert, active, cooperative, NAD, well hydrated  .		[] Abnormal:  Neck		Normal: supple, no cervical adenopathy, no palpable thyroid  .		[] Abnormal:  Cardiovascular	Normal: regular rate, normal S1, S2, no murmurs  .		[] Abnormal:  Respiratory	Normal: no chest wall deformity, normal respiratory pattern, CTA B/L  .		[] Abnormal:  Abdominal	Normal: soft, ND, NT, bowel sounds present, no masses, no organomegaly  .		[] Abnormal:  		Normal normal genitalia, testes descended, circumcised/uncircumcised  .		Nilay stage:			Breast nilay:  .		Menstrual history:  .		[] Abnormal:  Extremities	Normal: FROM x4  .		[] Abnormal:  Skin		Normal: intact and not indurated, no rash, no acanthosis nigricans  .		[] Abnormal:  Neurologic	Normal: grossly intact  .		[] Abnormal:    LABS                        10.9   5.88  )-----------( 191      ( 10 Giuseppe 2025 05:15 )             31.1                               136    |  106    |  8                   Calcium: 8.8   / iCa: x      (06-10 @ 05:15)    ----------------------------<  71        Magnesium: 1.8                              4.0     |  25     |  0.46             Phosphorous: 3.6      TPro  6.1    /  Alb  2.3    /  TBili  0.2    /  DBili  x      /  AST  27     /  ALT  31     /  AlkPhos  59     10 Jun 2025 05:15    CAPILLARY BLOOD GLUCOSE            Assesment/plan       Interval Events:  pt in nad    Allergies    No Known Allergies    Intolerances      Endocrine/Metabolic Medications:      Vital Signs Last 24 Hrs  T(C): 36.7 (10 Giuseppe 2025 08:25), Max: 36.9 (2025 23:41)  T(F): 98.1 (10 Giuseppe 2025 08:25), Max: 98.4 (2025 23:41)  HR: 66 (10 Giuseppe 2025 08:25) (63 - 75)  BP: 136/75 (10 Giuseppe 2025 08:25) (120/63 - 136/75)  BP(mean): --  RR: 17 (10 Giuseppe 2025 08:25) (17 - 18)  SpO2: 99% (10 Giuseppe 2025 08:25) (98% - 100%)    Parameters below as of 10 Giuseppe 2025 08:25  Patient On (Oxygen Delivery Method): room air          PHYSICAL EXAM  All physical exam findings normal, except those marked:  General:	Alert, active, cooperative, NAD, well hydrated  .		[] Abnormal:  Neck		Normal: supple, no cervical adenopathy, no palpable thyroid  .		[] Abnormal:  Cardiovascular	Normal: regular rate, normal S1, S2, no murmurs  .		[] Abnormal:  Respiratory	Normal: no chest wall deformity, normal respiratory pattern, CTA B/L  .		[] Abnormal:  Abdominal	Normal: soft, ND, NT, bowel sounds present, no masses, no organomegaly  .		[] Abnormal:  		Normal normal genitalia, testes descended, circumcised/uncircumcised  .		Nilay stage:			Breast nilay:  .		Menstrual history:  .		[] Abnormal:  Extremities	Normal: FROM x4  .		[] Abnormal:  Skin		Normal: intact and not indurated, no rash, no acanthosis nigricans  .		[] Abnormal:  Neurologic	Normal: grossly intact  .		[] Abnormal:    LABS                        10.9   5.88  )-----------( 191      ( 10 Giuseppe 2025 05:15 )             31.1                               136    |  106    |  8                   Calcium: 8.8   / iCa: x      (06-10 @ 05:15)    ----------------------------<  71        Magnesium: 1.8                              4.0     |  25     |  0.46             Phosphorous: 3.6      TPro  6.1    /  Alb  2.3    /  TBili  0.2    /  DBili  x      /  AST  27     /  ALT  31     /  AlkPhos  59     10 2025 05:15    CAPILLARY BLOOD GLUCOSE            Assesment/plan    · Assessment	  35y/F, from home, s/p spontaneous  at 12WOG 2/2 hydrops fetalis, SVT, Preeclampsia in last pregnancy, Depression, GERD presented to ED with chest pain and palpitation for 2 days.   Endocrine consulted for eval of abn tfts. Pt denies prev hx of thyroid dz. No fam hx of thyroid dz. Admits to occ palp.         Problem/Recommendation - 1:  ·  Problem: Hyperthyroidism.   ·  Recommendation: likely pregnancy induced- HCG > 252084  rec beta blockers- if tachycardic  holf off on anti- thyroid meds for now  check tfts in 2-4 wks   check TSI level  d/w prim team.

## 2025-06-10 NOTE — DISCHARGE NOTE PROVIDER - NSDCFUADDAPPT_GEN_ALL_CORE_FT
APPTS ARE READY TO BE MADE: [X] YES    Best Family or Patient Contact (if needed):    Additional Information about above appointments (if needed):    1: Dr. Estefani Cobb - Endocrinology - 1 month follow up for hyperthyroidism  2:   3:     Other comments or requests:   APPTS ARE READY TO BE MADE: [X] YES    Best Family or Patient Contact (if needed):    Additional Information about above appointments (if needed):    1.  Dr. Gonsales  [facility] [address], [facility] [address], [place] [address] [phone]  Please make an appointment for pre-surgical testing BEFORE Thursday 6/19/2025.   You are being scheduled for your operation (dilatation and evacuation) on THURSDAY JUNE 19TH, 2025 at [facility]. [address] [place] [phone]   2: Dr. Estefani Cobb - Endocrinology - 1 month follow up for hyperthyroidism  3:      Other comments or requests:   APPTS ARE READY TO BE MADE: [X] YES    Best Family or Patient Contact (if needed):    Additional Information about above appointments (if needed):    1.  Dr. Gonsales  [facility] [address] [facility] [address], [address], [place] [address] [phone]  Please make an appointment for pre-surgical testing BEFORE Thursday 6/19/2025.   You are being scheduled for your operation (dilatation and evacuation) on THURSDAY JUNE 19TH, 2025 at [facility]. [address] [place] [phone]   2: Dr. Estefani Cobb - Endocrinology - 1 month follow up for hyperthyroidism  3: Primary Care Doctor (patient needs a PCP - can be at [phone] [facility] or wherever is convenient for patient)    Other comments or requests:

## 2025-06-10 NOTE — DISCHARGE NOTE PROVIDER - CARE PROVIDER_API CALL
Estefani Cobb.  Endocrinology/Metab/Diabetes  [address] [place] [address]  Phone: [phone]  Fax: [phone]  Follow Up Time: 1 month   Estefani Cobb  Endocrinology/Metab/Diabetes  [address] [place] [address]  Phone: [phone]  Fax: [phone]  Follow Up Time: 1 month    Melvina Gonsales  Obstetrics and Gynecology  [address] [place] [id number]  Phone: [phone]  Fax: [phone]  Follow Up Time:

## 2025-06-10 NOTE — PROGRESS NOTE ADULT - PROBLEM SELECTOR PLAN 2
Transvaginal US: fetal anasarca; findings of known fetal demise  patient aware of fetal demise  requesting OBGYN consult prior to leaving hospital c/o palpitation, chest pain  TSH <0.01, T4 19.8, T3 5.74  denies prior h/o thyroid issues    -Dr Reza Bowens consulted  -outpt f/u in 1m

## 2025-06-10 NOTE — DISCHARGE NOTE PROVIDER - PROVIDER TOKENS
PROVIDER:[TOKEN:[[id number]],FOLLOWUP:[1 month]] PROVIDER:[TOKEN:[[id number]],FOLLOWUP:[1 month]],PROVIDER:[TOKEN:[[id number]]]

## 2025-06-10 NOTE — PROGRESS NOTE ADULT - PROBLEM SELECTOR PLAN 1
c/o palpitation, chest pain  TSH <0.01, T4 19.8, T3 5.74  denies prior h/o thyroid issues  -f/u total T3, TSI, TPO  -Endo, Dr Cobb consulted Transvaginal US: fetal anasarca; findings of known fetal demise  patient aware of fetal demise  requesting OBGYN consult prior to leaving hospital    -plan for D&E tomorrow  -NPO after midnight  -prn tylenol for discomfort

## 2025-06-10 NOTE — DISCHARGE NOTE PROVIDER - ATTENDING DISCHARGE PHYSICAL EXAMINATION:
T(C): 36.6 (06-11-25 @ 15:43), Max: 36.9 (06-11-25 @ 11:12)  HR: 70 (06-11-25 @ 17:29) (70 - 77)  BP: 132/72 (06-11-25 @ 17:29) (109/63 - 132/72)  RR: 19 (06-11-25 @ 15:43) (16 - 19)  SpO2: 100% (06-11-25 @ 15:43) (98% - 100%)    PHYSICAL EXAM:  GENERAL: NAD, lying in bed  HEAD:  Atraumatic, Normocephalic  EYES: EOMI, PERRLA, conjunctiva and sclera clear  NECK: Supple, No elevated JVD  CHEST/LUNG: Clear to auscultation bilaterally; No wheeze  HEART: Regular rate and rhythm; No murmurs, rubs, or gallops  ABDOMEN: Soft, Nontender, Nondistended; Bowel sounds present  EXTREMITIES:  2+ Peripheral Pulses, No clubbing, cyanosis, or edema  PSYCH: AAOx3  NEUROLOGY: CN II-XII grossly intact, moving all extremities  SKIN: No rashes or lesions

## 2025-06-10 NOTE — DISCHARGE NOTE PROVIDER - HOSPITAL COURSE
35y/F, from home, s/p spontaneous  at 12WOG 2/2 hydrops fetalis, SVT, Preeclampsia in last pregnancy, Depression, GERD presented to ED with chest pain and palpitation for 2 days. States her BP reading at home was -150, has headache and nausea, US showing evidence of fetal demise. TSH <0.01. Admitted for further evaluation.        Problem/Plan - 1:  ·  Problem: Hyperthyroidism.   ·  Plan: c/o palpitation, chest pain  TSH <0.01, T4 19.8, T3 5.74  denies prior h/o thyroid issues  -f/u total T3, TSI, TPO  -Endo, Dr Cobb consulted.     Problem/Plan - 2:  ·  Problem: Fetal demise before 20 weeks with retention of dead fetus.   ·  Plan: Transvaginal US: fetal anasarca; findings of known fetal demise  patient aware of fetal demise  requesting OBGYN consult prior to leaving hospital.     Problem/Plan - 3:  ·  Problem: Paroxysmal SVT (supraventricular tachycardia).   ·  Plan: has h/o SVT, not on any meds  tachycardia with minimum exertion  EKG: sinus rhythm  -admit to tele for at least 24 hrs  -monitor for now.     Problem/Plan - 4:  ·  Problem: Hypertension in pregnancy, preeclampsia.   ·  Plan: has h/o preeclampsia in last pregnancy  on aspirin 162 at home  c/w home meds.     Problem/Plan - 5:  ·  Problem: GERD (gastroesophageal reflux disease).   ·  Plan: on famotidine at home   c/w home meds.     Problem/Plan - 6:  ·  Problem: Major depression.   ·  Plan: on Sertraline at home  c/w home meds.     Problem/Plan - 7:  ·  Problem: Prophylactic measure.   ·  Plan: DVT prop: holding iso spontaneous .    Patient is stable for discharge per attending and is advised to follow up with PCP as outpatient.   35y/F, from home, s/p spontaneous  at 12WOG 2/2 hydrops fetalis, SVT, Preeclampsia in last pregnancy, Depression, GERD presented to ED with chest pain and palpitation for 2 days. States her BP reading at home was -150, has headache and nausea, US showing evidence of fetal demise. TSH <0.01. Admitted for further evaluation.      Problem/Plan - 1:  ·  Problem: Hyperthyroidism.   ·  Plan: c/o palpitation, chest pain  TSH <0.01, T4 19.8, T3 5.74  denies prior h/o thyroid issues  -f/u total T3, TSI, TPO  -Endo, Dr Cobb consulted.     Problem/Plan - 2:  ·  Problem: Fetal demise before 20 weeks with retention of dead fetus.   ·  Plan: Transvaginal US: fetal anasarca; findings of known fetal demise  patient aware of fetal demise  requesting OBGYN consult prior to leaving hospital.        H/o Paroxysmal SVT, no longer on metoprolol due to pregnancy, monitored on tele without addl episodes.  H/o preeclampsia (last pregnancy), depression, GERD continued on home meds.  Patient is stable for discharge per attending and is advised to follow up with PCP as outpatient.   35y/F, from home, s/p spontaneous  at 12WOG 2/2 hydrops fetalis, SVT, Preeclampsia in last pregnancy, Depression, GERD presented to ED with chest pain and palpitation for 2 days. States her BP reading at home was -150, has headache and nausea, US showing evidence of fetal demise. TSH <0.01. Admitted for further evaluation.     Transvaginal US: fetal anasarca; findings of known fetal demise. Pt has known about miscarriage for 1+ weeks prior to presentation at the hospital. OBGYN consulted, rec outpatient management. Appointment made prior to pt being discharged.  Pt c/o palpitation, chest pain. TSH <0.01, T4 19.8, T3 5.74. No h/o thyroid issues. Endo, Dr Cobb consulted, rec outpt f/u in 1month.  H/o Paroxysmal SVT, no longer on metoprolol due to pregnancy, monitored on tele without addl episodes.  H/o preeclampsia (last pregnancy), depression, GERD continued on home meds.  Patient is stable for discharge per attending and is advised to follow up with PCP as outpatient.

## 2025-06-10 NOTE — PROGRESS NOTE ADULT - PROBLEM SELECTOR PLAN 3
has h/o SVT, not on any meds  tachycardia with minimum exertion  EKG: sinus rhythm  -admit to tele for at least 24 hrs  -monitor for now has h/o SVT, not on any meds  tachycardia with minimum exertion  EKG: sinus rhythm    -monitor for now

## 2025-06-10 NOTE — DISCHARGE NOTE PROVIDER - NSDCCPCAREPLAN_GEN_ALL_CORE_FT
PRINCIPAL DISCHARGE DIAGNOSIS  Diagnosis: Fetal demise before 20 weeks with retention of dead fetus  Assessment and Plan of Treatment: You came to the hospital with abdominal pain and known fetus with Hydrops Fetalis. OBGYN specialists were consulted and their recommendations were followed. You are recommended to have an outpatient D&E procedure very soon after discharge from the hospital.  Delay in treating your condition can cause complications including infection and blood clots. Please follow up closely with your OBGYN.      SECONDARY DISCHARGE DIAGNOSES  Diagnosis: Hyperthyroidism  Assessment and Plan of Treatment: You came to the hospital with headache and palpitations. You were found to have hyperthyroidism, a common issue during the first trimester of pregnancy. Endocrinology specialists were consulted and their recommendations were followed. Your thyroid function should return to normal after your D&E procedure. Please follow up outpatient with Endocrinologist Dr. Cobb in 1 month to confirm the resolution of your hyperthyroidism.    Diagnosis: Paroxysmal SVT (supraventricular tachycardia)  Assessment and Plan of Treatment: You came to the hospital with palpitations and were found to have a very high heart rate. You were monitored on telemetry for any additional episodes of high heart rate and none were identified. Your high heart rate was likely due to your hyperthyroidism and your fetal demise.    Diagnosis: Major depression  Assessment and Plan of Treatment: You have a history of depression. You were continued on your home medications while in the hospital. Please continue to take your medications as prescribd at home and see your PCP within 1-2w.    Diagnosis: GERD (gastroesophageal reflux disease)  Assessment and Plan of Treatment: You have history of GERD- gastroesophageal reflux which is a chronic disease that occurs when stomach acid or bile flows into the food pipe and irritates the lining. You are taking FAMOTIDINE AT HOME. Please continue to take your HOME medications and follow up with your PCP / Gastroenterologist in a week from discharge.     PRINCIPAL DISCHARGE DIAGNOSIS  Diagnosis: Fetal demise before 20 weeks with retention of dead fetus  Assessment and Plan of Treatment: You came to the hospital with abdominal pain and known fetus with Hydrops Fetalis. OBGYN specialists were consulted and their recommendations were followed. You are recommended to have an outpatient D&E procedure very soon after discharge from the hospital.  Delay in treating your condition can cause complications including infection and blood clots. Please follow up closely with your OBGYN.      SECONDARY DISCHARGE DIAGNOSES  Diagnosis: Hyperthyroidism  Assessment and Plan of Treatment: You came to the hospital with headache and palpitations. You were found to have hyperthyroidism, a common issue during the first trimester of pregnancy. Endocrinology specialists were consulted and their recommendations were followed. Your thyroid function should return to normal after your D&E procedure. Please follow up outpatient with Endocrinologist Dr. Cobb in 1 month to confirm the resolution of your hyperthyroidism.    Diagnosis: Paroxysmal SVT (supraventricular tachycardia)  Assessment and Plan of Treatment: You came to the hospital with palpitations. You have a history of SVT and were previously on metoprolol prior to your pregnancy. Your EKG was normal. You were monitored on telemetry for any episodes of high heart rate and none were identified. Please see your cardiology/PCP after discharge from the hospital and resume your metoprolol when appropriate.    Diagnosis: Major depression  Assessment and Plan of Treatment: You have a history of depression. You were continued on your home medications while in the hospital. Please continue to take your medications as prescribd at home and see your PCP within 1-2w.    Diagnosis: GERD (gastroesophageal reflux disease)  Assessment and Plan of Treatment: You have history of GERD- gastroesophageal reflux which is a chronic disease that occurs when stomach acid or bile flows into the food pipe and irritates the lining. You are taking FAMOTIDINE AT HOME. Please continue to take your HOME medications and follow up with your PCP / Gastroenterologist in a week from discharge.     PRINCIPAL DISCHARGE DIAGNOSIS  Diagnosis: Fetal demise before 20 weeks with retention of dead fetus  Assessment and Plan of Treatment: You came to the hospital with abdominal pain and known fetus with Hydrops Fetalis. OBGYN specialists were consulted and their recommendations were followed. You are recommended to have an outpatient D&E procedure very soon after discharge from the hospital.  Delay in treating your condition can cause complications including infection and bleeding. Please follow up with the following appointment: _______________________________________.      SECONDARY DISCHARGE DIAGNOSES  Diagnosis: Hyperthyroidism  Assessment and Plan of Treatment: You came to the hospital with headache and palpitations. You were found to have hyperthyroidism, a common issue during the first trimester of pregnancy. Endocrinology specialists were consulted and their recommendations were followed. Your thyroid function should return to normal after your D&E procedure. Please follow up outpatient with Endocrinologist Dr. Cobb in 1 month to confirm the resolution of your hyperthyroidism.    Diagnosis: Paroxysmal SVT (supraventricular tachycardia)  Assessment and Plan of Treatment: You came to the hospital with palpitations. You have a history of SVT and were previously on metoprolol prior to your pregnancy. Your EKG was normal. You were monitored on telemetry for any episodes of high heart rate and none were identified. Please see your cardiology/PCP after discharge from the hospital and resume your metoprolol when appropriate.    Diagnosis: Major depression  Assessment and Plan of Treatment: You have a history of depression. You were continued on your home medications while in the hospital. Please continue to take your medications as prescribd at home and see your PCP within 1-2w.    Diagnosis: GERD (gastroesophageal reflux disease)  Assessment and Plan of Treatment: You have history of GERD- gastroesophageal reflux which is a chronic disease that occurs when stomach acid or bile flows into the food pipe and irritates the lining. You are taking FAMOTIDINE AT HOME. Please continue to take your HOME medications and follow up with your PCP / Gastroenterologist in a week from discharge.     PRINCIPAL DISCHARGE DIAGNOSIS  Diagnosis: Fetal demise before 20 weeks with retention of dead fetus  Assessment and Plan of Treatment: You came to the hospital with abdominal pain and known fetus with Hydrops Fetalis. OBGYN specialists were consulted and their recommendations were followed. You are recommended to have an outpatient D&E procedure very soon after discharge from the hospital.  Delay in treating your condition can cause complications including infection and bleeding. Please follow up with the following appointment:  Dr. Gonsales   [facility] [place] [address] [facility], [address], [place] [address]  [phone]  Please make an appointment for pre-surgical testing BEFORE Thursday 6/19/2025.   You are being scheduled for your operation (dilatation and evacuation) on THURSDAY JUNE 19TH, 2025 at [facility]. [address] [place] [id number]        SECONDARY DISCHARGE DIAGNOSES  Diagnosis: Hyperthyroidism  Assessment and Plan of Treatment: You came to the hospital with headache and palpitations. You were found to have hyperthyroidism, a common issue during the first trimester of pregnancy. Endocrinology specialists were consulted and their recommendations were followed. Your thyroid function should return to normal after your D&E procedure. Please follow up outpatient with Endocrinologist Dr. Cobb in 1 month to confirm the resolution of your hyperthyroidism.    Diagnosis: Paroxysmal SVT (supraventricular tachycardia)  Assessment and Plan of Treatment: You came to the hospital with palpitations. You have a history of SVT and were previously on metoprolol prior to your pregnancy. Your EKG was normal. You were monitored on telemetry for any episodes of high heart rate and none were identified. Please see your cardiology/PCP after discharge from the hospital and resume your metoprolol when appropriate.    Diagnosis: Major depression  Assessment and Plan of Treatment: You have a history of depression. You were continued on your home medications while in the hospital. Please continue to take your medications as prescribd at home and see your PCP within 1-2w.    Diagnosis: GERD (gastroesophageal reflux disease)  Assessment and Plan of Treatment: You have history of GERD- gastroesophageal reflux which is a chronic disease that occurs when stomach acid or bile flows into the food pipe and irritates the lining. You are taking FAMOTIDINE AT HOME. Please continue to take your HOME medications and follow up with your PCP / Gastroenterologist in a week from discharge.

## 2025-06-10 NOTE — DISCHARGE NOTE PROVIDER - NSDCMRMEDTOKEN_GEN_ALL_CORE_FT
aspirin 81 mg oral tablet: 2 tab(s) orally once a day  doxylamine 25 mg oral tablet: 0.5 tab(s) orally once a day (at bedtime) as needed for nausea and vomiting  famotidine 20 mg oral tablet: 1 tab(s) orally once a day  sertraline 100 mg oral tablet: 1 tab(s) orally once a day

## 2025-06-10 NOTE — DISCHARGE NOTE PROVIDER - CARE PROVIDERS DIRECT ADDRESSES
,DirectAddress_Unknown ,DirectAddress_Unknown,[email or web address]

## 2025-06-10 NOTE — PROGRESS NOTE ADULT - SUBJECTIVE AND OBJECTIVE BOX
SUBJECTIVE: **TO UPDATE**  No overnight events. Pt seen at bedside, states that they feel "__________." Pt endorses _____. Pt denies headache, fever, chills, SOB, chest pain, cough, abd pain, n/v/d, constipation, dysuria, urinary frequency, back pain, myalgias, weakness, dizziness, gait disturbance, numbness/tingling, blurry vision.      OBJECTIVE:    T(C): 36.7 (06-10-25 @ 08:25), Max: 36.9 (06-09-25 @ 23:41)  HR: 66 (06-10-25 @ 08:25) (63 - 75)  BP: 136/75 (06-10-25 @ 08:25) (120/63 - 136/75)  RR: 17 (06-10-25 @ 08:25) (17 - 18)  SpO2: 99% (06-10-25 @ 08:25) (98% - 100%)            ***TO BE EDITED***  CONSTITUTIONAL: No apparent distress, resting comfortably  EYES: Pupils symmetric, EOMI, No conjunctival or scleral injection, non-icteric  ENMT: Oral mucosa with moist membranes  RESP: No respiratory distress, no use of accessory muscles; CTA b/l, no crackles or wheezes  CV: RRR, +S1S2, no murmurs appreciated; no peripheral edema  GI: Soft, NTND, no rebound, no guarding  MSK: No digital clubbing or cyanosis; Extremities moving equally without additional effort; gait not appreciated  : deferred  SKIN: No rashes or ulcers noted  NEURO: CN II-XII grossly intact   PSYCH: Appropriate insight/judgment; A+O x 3, mood and affect appropriate, recent/remote memory intact    No Known Allergies      acetaminophen     Tablet .. 650 milliGRAM(s) Oral every 6 hours PRN  aluminum hydroxide/magnesium hydroxide/simethicone Suspension 30 milliLiter(s) Oral every 4 hours PRN  famotidine    Tablet 20 milliGRAM(s) Oral daily  melatonin 3 milliGRAM(s) Oral at bedtime PRN  ondansetron Injectable 4 milliGRAM(s) IV Push every 8 hours PRN  sertraline 100 milliGRAM(s) Oral daily                              10.9   5.88  )-----------( 191      ( 10 Giuseppe 2025 05:15 )             31.1     06-10    136  |  106  |  8   ----------------------------<  71  4.0   |  25  |  0.46[L]    Ca    8.8      10 Giuseppe 2025 05:15  Phos  3.6     06-10  Mg     1.8     06-10    TPro  6.1  /  Alb  2.3[L]  /  TBili  0.2  /  DBili  x   /  AST  27  /  ALT  31  /  AlkPhos  59  06-10    Thyroperoxidase Antibody (06.09.25 @ 05:00)   Thyroperoxidase Antibody: 20.7:

## 2025-06-10 NOTE — DISCHARGE NOTE PROVIDER - NSDCCAREPROVSEEN_GEN_ALL_CORE_FT
Jacy, Miguel Thorpe, Morena Choi, Angélica Cobb, Estefani Mathews, Peyton Gerard, Mirian Crisostomo, Diomedes Rutledge, Jordan Apple, Yanet Moses, Michelle Bianchi, Deedee Jack

## 2025-06-11 ENCOUNTER — TRANSCRIPTION ENCOUNTER (OUTPATIENT)
Age: 35
End: 2025-06-11

## 2025-06-11 VITALS — DIASTOLIC BLOOD PRESSURE: 72 MMHG | SYSTOLIC BLOOD PRESSURE: 132 MMHG | HEART RATE: 70 BPM

## 2025-06-11 LAB
ANION GAP SERPL CALC-SCNC: 5 MMOL/L — SIGNIFICANT CHANGE UP (ref 5–17)
APTT BLD: 28.5 SEC — SIGNIFICANT CHANGE UP (ref 26.1–36.8)
BLD GP AB SCN SERPL QL: SIGNIFICANT CHANGE UP
BUN SERPL-MCNC: 8 MG/DL — SIGNIFICANT CHANGE UP (ref 7–18)
CALCIUM SERPL-MCNC: 9.3 MG/DL — SIGNIFICANT CHANGE UP (ref 8.4–10.5)
CHLORIDE SERPL-SCNC: 104 MMOL/L — SIGNIFICANT CHANGE UP (ref 96–108)
CO2 SERPL-SCNC: 26 MMOL/L — SIGNIFICANT CHANGE UP (ref 22–31)
CREAT SERPL-MCNC: 0.53 MG/DL — SIGNIFICANT CHANGE UP (ref 0.5–1.3)
EGFR: 124 ML/MIN/1.73M2 — SIGNIFICANT CHANGE UP
EGFR: 124 ML/MIN/1.73M2 — SIGNIFICANT CHANGE UP
GLUCOSE SERPL-MCNC: 77 MG/DL — SIGNIFICANT CHANGE UP (ref 70–99)
HCT VFR BLD CALC: 34.5 % — SIGNIFICANT CHANGE UP (ref 34.5–45)
HGB BLD-MCNC: 12.3 G/DL — SIGNIFICANT CHANGE UP (ref 11.5–15.5)
INR BLD: 0.93 RATIO — SIGNIFICANT CHANGE UP (ref 0.85–1.16)
MAGNESIUM SERPL-MCNC: 2 MG/DL — SIGNIFICANT CHANGE UP (ref 1.6–2.6)
MCHC RBC-ENTMCNC: 28 PG — SIGNIFICANT CHANGE UP (ref 27–34)
MCHC RBC-ENTMCNC: 35.7 G/DL — SIGNIFICANT CHANGE UP (ref 32–36)
MCV RBC AUTO: 78.6 FL — LOW (ref 80–100)
NRBC BLD AUTO-RTO: 0 /100 WBCS — SIGNIFICANT CHANGE UP (ref 0–0)
PHOSPHATE SERPL-MCNC: 3.5 MG/DL — SIGNIFICANT CHANGE UP (ref 2.5–4.5)
PLATELET # BLD AUTO: 224 K/UL — SIGNIFICANT CHANGE UP (ref 150–400)
POTASSIUM SERPL-MCNC: 4.4 MMOL/L — SIGNIFICANT CHANGE UP (ref 3.5–5.3)
POTASSIUM SERPL-SCNC: 4.4 MMOL/L — SIGNIFICANT CHANGE UP (ref 3.5–5.3)
PROTHROM AB SERPL-ACNC: 10.8 SEC — SIGNIFICANT CHANGE UP (ref 9.9–13.4)
RBC # BLD: 4.39 M/UL — SIGNIFICANT CHANGE UP (ref 3.8–5.2)
RBC # FLD: 14.6 % — HIGH (ref 10.3–14.5)
SODIUM SERPL-SCNC: 135 MMOL/L — SIGNIFICANT CHANGE UP (ref 135–145)
TSI ACT/NOR SER: <0.1 IU/L — SIGNIFICANT CHANGE UP (ref 0–0.55)
WBC # BLD: 6.21 K/UL — SIGNIFICANT CHANGE UP (ref 3.8–10.5)
WBC # FLD AUTO: 6.21 K/UL — SIGNIFICANT CHANGE UP (ref 3.8–10.5)

## 2025-06-11 PROCEDURE — 84295 ASSAY OF SERUM SODIUM: CPT

## 2025-06-11 PROCEDURE — 93005 ELECTROCARDIOGRAM TRACING: CPT

## 2025-06-11 PROCEDURE — 84436 ASSAY OF TOTAL THYROXINE: CPT

## 2025-06-11 PROCEDURE — 86376 MICROSOMAL ANTIBODY EACH: CPT

## 2025-06-11 PROCEDURE — 85027 COMPLETE CBC AUTOMATED: CPT

## 2025-06-11 PROCEDURE — 84443 ASSAY THYROID STIM HORMONE: CPT

## 2025-06-11 PROCEDURE — 76817 TRANSVAGINAL US OBSTETRIC: CPT

## 2025-06-11 PROCEDURE — 80048 BASIC METABOLIC PNL TOTAL CA: CPT

## 2025-06-11 PROCEDURE — 86900 BLOOD TYPING SEROLOGIC ABO: CPT

## 2025-06-11 PROCEDURE — 76802 OB US < 14 WKS ADDL FETUS: CPT

## 2025-06-11 PROCEDURE — 83880 ASSAY OF NATRIURETIC PEPTIDE: CPT

## 2025-06-11 PROCEDURE — 83605 ASSAY OF LACTIC ACID: CPT

## 2025-06-11 PROCEDURE — 83690 ASSAY OF LIPASE: CPT

## 2025-06-11 PROCEDURE — 84481 FREE ASSAY (FT-3): CPT

## 2025-06-11 PROCEDURE — 80053 COMPREHEN METABOLIC PANEL: CPT

## 2025-06-11 PROCEDURE — 81001 URINALYSIS AUTO W/SCOPE: CPT

## 2025-06-11 PROCEDURE — 99285 EMERGENCY DEPT VISIT HI MDM: CPT

## 2025-06-11 PROCEDURE — 36415 COLL VENOUS BLD VENIPUNCTURE: CPT

## 2025-06-11 PROCEDURE — 82947 ASSAY GLUCOSE BLOOD QUANT: CPT

## 2025-06-11 PROCEDURE — 84132 ASSAY OF SERUM POTASSIUM: CPT

## 2025-06-11 PROCEDURE — 83735 ASSAY OF MAGNESIUM: CPT

## 2025-06-11 PROCEDURE — 86850 RBC ANTIBODY SCREEN: CPT

## 2025-06-11 PROCEDURE — 96374 THER/PROPH/DIAG INJ IV PUSH: CPT

## 2025-06-11 PROCEDURE — 84484 ASSAY OF TROPONIN QUANT: CPT

## 2025-06-11 PROCEDURE — 85025 COMPLETE CBC W/AUTO DIFF WBC: CPT

## 2025-06-11 PROCEDURE — 84702 CHORIONIC GONADOTROPIN TEST: CPT

## 2025-06-11 PROCEDURE — 99239 HOSP IP/OBS DSCHRG MGMT >30: CPT | Mod: GC

## 2025-06-11 PROCEDURE — 86901 BLOOD TYPING SEROLOGIC RH(D): CPT

## 2025-06-11 PROCEDURE — 85730 THROMBOPLASTIN TIME PARTIAL: CPT

## 2025-06-11 PROCEDURE — 84445 ASSAY OF TSI GLOBULIN: CPT

## 2025-06-11 PROCEDURE — 84100 ASSAY OF PHOSPHORUS: CPT

## 2025-06-11 PROCEDURE — 76815 OB US LIMITED FETUS(S): CPT

## 2025-06-11 PROCEDURE — 87086 URINE CULTURE/COLONY COUNT: CPT

## 2025-06-11 PROCEDURE — T1013: CPT

## 2025-06-11 PROCEDURE — 82330 ASSAY OF CALCIUM: CPT

## 2025-06-11 PROCEDURE — 84480 ASSAY TRIIODOTHYRONINE (T3): CPT

## 2025-06-11 PROCEDURE — 85610 PROTHROMBIN TIME: CPT

## 2025-06-11 PROCEDURE — 82803 BLOOD GASES ANY COMBINATION: CPT

## 2025-06-11 RX ADMIN — SERTRALINE 100 MILLIGRAM(S): 100 TABLET, FILM COATED ORAL at 12:46

## 2025-06-11 RX ADMIN — Medication 20 MILLIGRAM(S): at 12:46

## 2025-06-11 NOTE — PROGRESS NOTE ADULT - PROBLEM SELECTOR PLAN 6
on Sertraline at home  c/w home meds

## 2025-06-11 NOTE — PROGRESS NOTE ADULT - PROBLEM SELECTOR PLAN 5
on famotidine at home   c/w home meds

## 2025-06-11 NOTE — DISCHARGE NOTE NURSING/CASE MANAGEMENT/SOCIAL WORK - NSDCFUADDAPPT_GEN_ALL_CORE_FT
APPTS ARE READY TO BE MADE: [X] YES    Best Family or Patient Contact (if needed):    Additional Information about above appointments (if needed):    1.  Dr. Gonsales  [facility] [address], [facility] [address], [place] [address] [phone]  Please make an appointment for pre-surgical testing BEFORE Thursday 6/19/2025.   You are being scheduled for your operation (dilatation and evacuation) on THURSDAY JUNE 19TH, 2025 at [facility]. [address] [place] [id number]   2: Dr. Estefani Cobb - Endocrinology - 1 month follow up for hyperthyroidism  3:      Other comments or requests:

## 2025-06-11 NOTE — PROGRESS NOTE ADULT - PROBLEM SELECTOR PLAN 3
has h/o SVT, not on any meds  tachycardia with minimum exertion  EKG: sinus rhythm    -monitor for now

## 2025-06-11 NOTE — PROGRESS NOTE ADULT - PROBLEM SELECTOR PROBLEM 3
Paroxysmal SVT (supraventricular tachycardia)

## 2025-06-11 NOTE — PROGRESS NOTE ADULT - PROBLEM SELECTOR PLAN 1
Transvaginal US: fetal anasarca; findings of known fetal demise  patient aware of fetal demise  requesting OBGYN consult prior to leaving hospital    -plan for D&E tomorrow  -NPO after midnight  -prn tylenol for discomfort Transvaginal US: fetal anasarca; findings of known fetal demise  patient aware of fetal demise  requesting OBGYN consult prior to leaving hospital    -f/u OBGYN recs for D&E timeline  -prn tylenol for discomfort

## 2025-06-11 NOTE — PROGRESS NOTE ADULT - SUBJECTIVE AND OBJECTIVE BOX
Patient seen at bedside with Dr. Marcus, patient resting comfortably denies complaints at this time. Denies vaginal bleeding, vaginal discharge, headache, chest pain, shortness of breath, N/V/D/C, fever, chills, abdominal pain    Gen: A&Ox3, NAD  abd:  soft, nontender, nondistended, gravid uterus, fundus below umbilicus, no rebound or guarding  pelvis: no vaginal bleeding or abnormal discharge     Bedside sono completed by Dr. Marcus   noted large placenta, measuring 8.9 cm   crown rump length 5.05     LABS:                        12.3   6.21  )-----------( 224      ( 11 Jun 2025 08:00 )             34.5     06-11    135  |  104  |  8   ----------------------------<  77  4.4   |  26  |  0.53    Ca    9.3      11 Jun 2025 08:00  Phos  3.5     06-11  Mg     2.0     06-11    TPro  6.1  /  Alb  2.3[L]  /  TBili  0.2  /  DBili  x   /  AST  27  /  ALT  31  /  AlkPhos  59  06-10    PT/INR - ( 11 Jun 2025 08:00 )   PT: 10.8 sec;   INR: 0.93 ratio    PTT - ( 11 Jun 2025 08:00 )  PTT:28.5 sec

## 2025-06-11 NOTE — PROGRESS NOTE ADULT - NUTRITIONAL ASSESSMENT
Diet, Regular (06-09-25 @ 16:36) [Active]

## 2025-06-11 NOTE — PROGRESS NOTE ADULT - TIME BILLING
Time spent includes direct patient care  (interview and examination of patient), discussion with other providers, support staff and/or patient's family members, review of medical records, ordering diagnostic tests and analyzing results, and documentation.     I examined this patient and discussed their management with house staff on 6/11/2025.

## 2025-06-11 NOTE — PROGRESS NOTE ADULT - PROBLEM SELECTOR PLAN 2
c/o palpitation, chest pain  TSH <0.01, T4 19.8, T3 5.74  denies prior h/o thyroid issues    -Dr Reza Bowens consulted  -outpt f/u in 1m

## 2025-06-11 NOTE — PROGRESS NOTE ADULT - REASON FOR ADMISSION
Palpitation, chest pain

## 2025-06-11 NOTE — PROGRESS NOTE ADULT - PROBLEM SELECTOR PLAN 1
Follow up outpatient for management with D&E    Dr. Ilda Cruz [facility] [place] [address] [facility], [address], [place] [address]  [phone]    Management per medical team   No acute GYN management at this time   Patient for discharge per medicine team  Discussed with Dr. Marcus

## 2025-06-11 NOTE — PROGRESS NOTE ADULT - SUBJECTIVE AND OBJECTIVE BOX
SUBJECTIVE: **TO UPDATE**  No overnight events. Pt seen at bedside, states that they feel "__________." Pt endorses _____. Pt denies headache, fever, chills, SOB, chest pain, cough, abd pain, n/v/d, constipation, dysuria, urinary frequency, back pain, myalgias, weakness, dizziness, gait disturbance, numbness/tingling, blurry vision.      OBJECTIVE:    T(C): 36.9 (06-11-25 @ 07:59), Max: 36.9 (06-11-25 @ 07:59)  HR: 72 (06-11-25 @ 07:59) (62 - 72)  BP: 137/89 (06-11-25 @ 07:59) (112/73 - 142/68)  RR: 16 (06-11-25 @ 07:59) (16 - 19)  SpO2: 100% (06-11-25 @ 07:59) (98% - 100%)        06-10-25 @ 07:01  -  06-11-25 @ 07:00  --------------------------------------------------------  IN: 290 mL / OUT: 0 mL / NET: 290 mL          ***TO BE EDITED***  CONSTITUTIONAL: No apparent distress, resting comfortably  EYES: Pupils symmetric, EOMI, No conjunctival or scleral injection, non-icteric  ENMT: Oral mucosa with moist membranes  RESP: No respiratory distress, no use of accessory muscles; CTA b/l, no crackles or wheezes  CV: RRR, +S1S2, no murmurs appreciated; no peripheral edema  GI: Soft, NTND, no rebound, no guarding  MSK: No digital clubbing or cyanosis; Extremities moving equally without additional effort; gait not appreciated  : deferred  SKIN: No rashes or ulcers noted  NEURO: CN II-XII grossly intact   PSYCH: Appropriate insight/judgment; A+O x 3, mood and affect appropriate, recent/remote memory intact    No Known Allergies      acetaminophen     Tablet .. 650 milliGRAM(s) Oral every 6 hours PRN  aluminum hydroxide/magnesium hydroxide/simethicone Suspension 30 milliLiter(s) Oral every 4 hours PRN  famotidine    Tablet 20 milliGRAM(s) Oral daily  melatonin 3 milliGRAM(s) Oral at bedtime PRN  ondansetron Injectable 4 milliGRAM(s) IV Push every 8 hours PRN  sertraline 100 milliGRAM(s) Oral daily                            12.3   6.21  )-----------( 224      ( 11 Jun 2025 08:00 )             34.5       06-10    136  |  106  |  8   ----------------------------<  71  4.0   |  25  |  0.46[L]    Ca    8.8      10 Giuseppe 2025 05:15  Phos  3.6     06-10  Mg     1.8     06-10    TPro  6.1  /  Alb  2.3[L]  /  TBili  0.2  /  DBili  x   /  AST  27  /  ALT  31  /  AlkPhos  59  06-10             SUBJECTIVE: Overnight, continues to have self-limited mild nosebleed and vaginal bleeding. Pt seen at bedside,  #[id number], states she is having mild abd pain, malaise. Pt deines n/v. Pt denies headache, fever, chills, SOB, chest pain, weakness, dizziness, numbness/tingling.      OBJECTIVE:    T(C): 36.9 (06-11-25 @ 07:59), Max: 36.9 (06-11-25 @ 07:59)  HR: 72 (06-11-25 @ 07:59) (62 - 72)  BP: 137/89 (06-11-25 @ 07:59) (112/73 - 142/68)  RR: 16 (06-11-25 @ 07:59) (16 - 19)  SpO2: 100% (06-11-25 @ 07:59) (98% - 100%)        06-10-25 @ 07:01  -  06-11-25 @ 07:00  --------------------------------------------------------  IN: 290 mL / OUT: 0 mL / NET: 290 mL        CONSTITUTIONAL: No apparent distress, resting comfortably  EYES: Pupils symmetric, EOMI, No conjunctival or scleral injection, non-icteric  ENMT: Oral mucosa with moist membranes  RESP: No respiratory distress, no use of accessory muscles; CTA b/l, no crackles or wheezes  CV: RRR, +S1S2, no murmurs appreciated; no peripheral edema  GI: gravid abd  MSK: No digital clubbing or cyanosis; Extremities moving equally without additional effort; gait not appreciated  : +low volume vaginal bleeding  SKIN: No rashes or ulcers noted  NEURO: CN II-XII grossly intact   PSYCH: Appropriate insight/judgment; A+O x 3, mood and affect appropriate, recent/remote memory intact      No Known Allergies      acetaminophen     Tablet .. 650 milliGRAM(s) Oral every 6 hours PRN  aluminum hydroxide/magnesium hydroxide/simethicone Suspension 30 milliLiter(s) Oral every 4 hours PRN  famotidine    Tablet 20 milliGRAM(s) Oral daily  melatonin 3 milliGRAM(s) Oral at bedtime PRN  ondansetron Injectable 4 milliGRAM(s) IV Push every 8 hours PRN  sertraline 100 milliGRAM(s) Oral daily                            12.3   6.21  )-----------( 224      ( 11 Jun 2025 08:00 )             34.5       06-10    136  |  106  |  8   ----------------------------<  71  4.0   |  25  |  0.46[L]    Ca    8.8      10 Giuseppe 2025 05:15  Phos  3.6     06-10  Mg     1.8     06-10    TPro  6.1  /  Alb  2.3[L]  /  TBili  0.2  /  DBili  x   /  AST  27  /  ALT  31  /  AlkPhos  59  06-10

## 2025-06-11 NOTE — PROGRESS NOTE ADULT - ASSESSMENT
35y/F, from home, s/p spontaneous  at [facility] 2/2 hydrops fetalis, SVT, Preeclampsia in last pregnancy, Depression, GERD presented to ED with chest pain and palpitation for 2 days    Dr. Marcus discussed with patient that due to the size of the placenta and fetus, the patient is not a candidate for management with dilation and evacuation at [facility] and this procedure should be completed outpatient with Family Planning   Patient expressed understanding and all questions were answered 
35y/F, from home, s/p spontaneous  at [facility] 2/2 hydrops fetalis, SVT, Preeclampsia in last pregnancy, Depression, GERD presented to ED with chest pain and palpitation for 2 days. States her BP reading at home was -150, has headache and nausea, US showing evidence of fetal demise. TSH <0.01. Admitted for further evaluation. 

## 2025-06-11 NOTE — DISCHARGE NOTE NURSING/CASE MANAGEMENT/SOCIAL WORK - FINANCIAL ASSISTANCE
[facility] provides services at a reduced cost to those who are determined to be eligible through [facility]’s financial assistance program. Information regarding [facility]’s financial assistance program can be found by going to [email or web address] or by calling [phone].

## 2025-06-11 NOTE — DISCHARGE NOTE NURSING/CASE MANAGEMENT/SOCIAL WORK - NSDCPEFALRISK_GEN_ALL_CORE
For information on Fall & Injury Prevention, visit: [email or web address] OR  [email or web address] OR  https://www.cdc.gov/steadi/patient.html

## 2025-06-11 NOTE — PROGRESS NOTE ADULT - PROBLEM SELECTOR PLAN 7
DVT prop: holding iso spontaneous 

## 2025-06-11 NOTE — PROGRESS NOTE ADULT - ATTENDING COMMENTS
Patient is a 35-year-old female admitted from home with a spontaneous  at 12 weeks of gestation due to hydrops fetalis. The OB team was consulted, and care was personally discussed with Dr. Marcus, who indicated the patient is not a candidate for D&E at [facility]. She is scheduled for the outpatient procedure at [facility] in [place] on , and Dr. Marcus agreed this timeframe is acceptable. The patient was counseled on the plan and given her follow-up information.
Patient seen/evaluated at bedside on 6/10/25. I agree with the resident progress note/outlined plan of care. My independent findings and conclusions are documented.    : Dalia ID [id number]    Pt with episode of vaginal bleeding. Had epistaxis which resolved. She reports h/o chronic mild HA and sinus congestion. Pt was advised to limit manipulation of nares with fingers/kleenex or other foreign object. Discussed planned D&E procedure on  to which patient was in agreement. She asked several questions about the procedure and timing with answers provided at bedside.     She denies chest pain, sob, orthopnea, PND    vitals reviewed  AAox NAD  S1S2 RRR II/VI SM best heard at LUSB  softly distended abdomen  no LE edema/cyanosis/clubbing    labs reviewed- coags wnl, hct 33.3--> 31, creatinine 0.46    36y/o F pmhx of SVT, 12 week pregnancy with fetal demise/spontaneous  secondary to hydrops fetalis, pre-eclampsia recently started on ASA presented to ER with chest pain and palpitations for 2 days found with pregnancy induced hyperthyroidism      A/P  # Symptomatic New Onset Hyperthyroidism- pregnancy induced  # Fetal Demise s/t hydrops fetalis  # Hx of 12 weeks pregnant  # Cora Valve Prolapse,   #h/o SVT   #tachycardia on metoprolol      off aspirin in setting of planned procedure  appreciate discussion with endocrinology and ob-gyn services  defer active management of hyperthyroid given pregnancy induced  -planned for D&E  --Continue home medications Aspirin, Sertraline, Famotidine  -low risk patient planned for low risk procedure, patient is medically optimized.

## 2025-06-11 NOTE — DISCHARGE NOTE NURSING/CASE MANAGEMENT/SOCIAL WORK - PATIENT PORTAL LINK FT
You can access the FollowMyHealth Patient Portal offered by [facility] by registering at the following website: [email or web address]. By joining [facility]’s FollowMyHealth portal, you will also be able to view your health information using other applications (apps) compatible with our system.

## 2025-06-11 NOTE — PROGRESS NOTE ADULT - PROBLEM SELECTOR PROBLEM 4
Hypertension in pregnancy, preeclampsia

## 2025-06-12 NOTE — CHART NOTE - NSCHARTNOTEFT_GEN_A_CORE
Notified by RN that patient had an episode of nosebleed and vaginal bleed. Chart reviewed and patient evaluated at bedside, patient remains hemodynamically stable, VS showing HR 64, /76, O2 99 on RA. Physical exam remarkable for mild vaginal bleeding with small clot noted. OB/GYN JESÚS Pearson present at bedside, utilized  Ayo (ID # [id number]). Repeat CBC showing stable Hgb 11.9 and PLT wnl. Coags normal.
Patient does not have a PCP. Will initiate referral via PAS.
Patient seen at bedside with  #[id number]. Patient states that she is having a headache, palpitations, and abdominal pain. Informed patient that her fetal demise is likely the source of her symptoms.    Consulted endocrinology for low TSH, high T3, T4.    Rec f/u outpatient in 1m, likely 2/2 pregnancy.  Consulted OBGYN for fetal demise (~1w), seen on TVUS. Pending OBGYN recs.  DC'ed tele.  DC'ed ASA.

## 2025-06-16 LAB — T3 SERPL-MCNC: 251 NG/DL — HIGH

## 2025-06-23 NOTE — ED ADULT TRIAGE NOTE - ARRIVAL FROM
Airway  Date/Time: 6/23/2025 6:29 PM  Reason: elective      General Information and Staff   Patient location during procedure: OR  Anesthesiologist: Rajiv Lara MD  Performed: anesthesiologist   Performed by: Rajiv Lara MD  Authorized by: Rajiv Lara MD        Indications and Patient Condition  Indications for airway management: anesthesia  Sedation level: deep      Preoxygenated: yesPatient position: sniffing    Mask difficulty assessment: 0 - not attempted    Final Airway Details    Final airway type: supraglottic airway      Successful airway: classic  Size: 4     Number of attempts at approach: 1         Home